# Patient Record
Sex: FEMALE | Race: BLACK OR AFRICAN AMERICAN | Employment: FULL TIME | URBAN - METROPOLITAN AREA
[De-identification: names, ages, dates, MRNs, and addresses within clinical notes are randomized per-mention and may not be internally consistent; named-entity substitution may affect disease eponyms.]

---

## 2017-05-02 ENCOUNTER — OFFICE VISIT (OUTPATIENT)
Dept: INTERNAL MEDICINE CLINIC | Age: 47
End: 2017-05-02

## 2017-05-02 VITALS
WEIGHT: 242 LBS | DIASTOLIC BLOOD PRESSURE: 79 MMHG | HEIGHT: 66 IN | TEMPERATURE: 97.7 F | HEART RATE: 91 BPM | BODY MASS INDEX: 38.89 KG/M2 | SYSTOLIC BLOOD PRESSURE: 121 MMHG | RESPIRATION RATE: 22 BRPM | OXYGEN SATURATION: 97 %

## 2017-05-02 DIAGNOSIS — B37.2 YEAST DERMATITIS: Primary | ICD-10-CM

## 2017-05-02 RX ORDER — KETOCONAZOLE 20 MG/G
CREAM TOPICAL
Qty: 15 G | Refills: 0 | Status: SHIPPED | OUTPATIENT
Start: 2017-05-02 | End: 2017-07-07 | Stop reason: SDUPTHER

## 2017-05-02 RX ORDER — INSULIN GLARGINE 100 [IU]/ML
INJECTION, SOLUTION SUBCUTANEOUS
Refills: 2 | COMMUNITY
Start: 2017-04-17 | End: 2017-05-02 | Stop reason: SDUPTHER

## 2017-05-02 RX ORDER — PEN NEEDLE, DIABETIC 31 GX5/16"
NEEDLE, DISPOSABLE MISCELLANEOUS
Refills: 11 | COMMUNITY
Start: 2017-03-25

## 2017-05-02 RX ORDER — FLUCONAZOLE 50 MG/1
50 TABLET ORAL DAILY
Qty: 5 TAB | Refills: 0 | Status: SHIPPED | OUTPATIENT
Start: 2017-05-02 | End: 2017-05-02 | Stop reason: SDUPTHER

## 2017-05-02 RX ORDER — SITAGLIPTIN AND METFORMIN HYDROCHLORIDE 50; 1000 MG/1; MG/1
TABLET, FILM COATED, EXTENDED RELEASE ORAL
Refills: 2 | COMMUNITY
Start: 2017-03-27 | End: 2017-05-02 | Stop reason: SDUPTHER

## 2017-05-02 RX ORDER — FLUCONAZOLE 50 MG/1
50 TABLET ORAL DAILY
Qty: 5 TAB | Refills: 0 | Status: SHIPPED | OUTPATIENT
Start: 2017-05-02 | End: 2017-05-07

## 2017-05-02 RX ORDER — KETOCONAZOLE 20 MG/G
CREAM TOPICAL
Qty: 15 G | Refills: 0 | Status: SHIPPED | OUTPATIENT
Start: 2017-05-02 | End: 2017-05-02 | Stop reason: SDUPTHER

## 2017-05-02 NOTE — PROGRESS NOTES
HISTORY OF PRESENT ILLNESS  Matias Steinberg is a 55 y.o. female. This is a patient of Dr. Jaclyn Douglas who presents today with complaints of rash. The patient has experienced symptoms x 1 month. She describes redness, itching, and burning to left groin. She has been applying triple antibiotic ointment, without relief. Visit Vitals    /79    Pulse 91    Temp 97.7 °F (36.5 °C) (Oral)    Resp 22    Ht 5' 5.5\" (1.664 m)    Wt 242 lb (109.8 kg)    SpO2 97%    BMI 39.66 kg/m2     HPI    Review of Systems   Constitutional: Negative for chills and fever. HENT: Negative for congestion. Eyes: Negative for blurred vision. Respiratory: Negative for cough and shortness of breath. Cardiovascular: Negative for chest pain, palpitations and leg swelling. Gastrointestinal: Negative for abdominal pain. Genitourinary: Negative. Musculoskeletal: Negative. Skin: Positive for itching and rash. Neurological: Negative for dizziness and headaches. Endo/Heme/Allergies: Negative. Psychiatric/Behavioral: Negative. Physical Exam   Constitutional: She is oriented to person, place, and time. She appears well-developed and well-nourished. No distress. HENT:   Head: Normocephalic and atraumatic. Cardiovascular: Normal rate and regular rhythm. Pulmonary/Chest: Effort normal and breath sounds normal. No respiratory distress. She has no wheezes. She has no rales. Musculoskeletal: She exhibits no edema. Neurological: She is alert and oriented to person, place, and time. Skin: Skin is warm and dry. Rash noted. Reddened macular rash with excoriation to left groin   Psychiatric: She has a normal mood and affect. Her behavior is normal.   Nursing note and vitals reviewed.       ASSESSMENT and PLAN    ICD-10-CM ICD-9-CM    1. Yeast dermatitis B37.2 112.3 Will order  fluconazole (DIFLUCAN) 50 mg tablet, 1 tab po daily x 5 days      ketoconazole (NIZORAL) 2 % topical cream apply to affected area of groin daily x 2 weeks. Provided information on GERD and diabetic diet per patient request.  Lab results and schedule of future lab studies reviewed with patient  Reviewed diet, exercise and weight control  Reviewed medications and side effects in detail  Patient encouraged to call or return to office if symptoms do not improve or worsen. Reviewed plan of care with patient who acknowledges understanding and agrees. Discussed above plan of care with Dr. Eda Masterson.

## 2017-05-02 NOTE — MR AVS SNAPSHOT
Visit Information Date & Time Provider Department Dept. Phone Encounter #  
 5/2/2017  2:00 PM Rojelio Sandra, [address] [facility] Internal Medicine [phone] [id number] Your Appointments 11/14/2017 11:15 AM  
PHYSICAL PRE OP with Kiran Romero DO [facility] Internal Medicine ([facility]) Appt Note: annual physical  
 [address] [facility] [address]  
[phone]  
  
   
 [address] [facility] [address] Upcoming Health Maintenance Date Due  
 FOOT EXAM Q1 5/26/1980 Pneumococcal 19-64 Medium Risk (1 of 1 - PPSV23) 5/26/1989 DTaP/Tdap/Td series (1 - Tdap) 5/26/1991 PAP AKA CERVICAL CYTOLOGY 5/26/1991 MICROALBUMIN Q1 2/28/2015 LIPID PANEL Q1 2/28/2015 HEMOGLOBIN A1C Q6M 2/4/2016 EYE EXAM RETINAL OR DILATED Q1 2/22/2017 INFLUENZA AGE 9 TO ADULT 8/1/2017 Allergies as of 5/2/2017  Review Complete On: 5/2/2017 By: Rojelio Sandra NP Severity Noted Reaction Type Reactions Lamisil [Terbinafine]  12/15/2011    Rash Lisinopril  03/27/2015    Hives  
 hives and swollen lip. Current Immunizations  Never Reviewed Name Date Influenza Vaccine Intradermal PF 11/8/2016 Pneumococcal Conjugate (PCV-13) 11/8/2016 Not reviewed this visit You Were Diagnosed With   
  
 Codes Comments Yeast dermatitis    -  Primary ICD-10-CM: B37.2 ICD-9-CM: 112.3 Vitals BP Pulse Temp Resp Height(growth percentile) Weight(growth percentile) 121/79 91 97.7 °F (36.5 °C) (Oral) 22 5' 5.5\" (1.664 m) 242 lb (109.8 kg) LMP SpO2 BMI OB Status Smoking Status 04/04/2017 (Approximate) 97% 39.66 kg/m2 Having regular periods Never Smoker BMI and BSA Data Body Mass Index Body Surface Area  
 39.66 kg/m 2 2.25 m 2 Preferred Pharmacy Pharmacy Name Phone [id number] [address] [id number] [phone] Your Updated Medication List  
  
   
This list is accurate as of: 5/2/17  2:52 PM.  Always use your most recent med list.  
  
  
  
  
 APIDRA SOLOSTAR 100 unit/mL pen Generic drug:  insulin glulisine INJECT 11 UNITS PLUS 2 UNITS FOR EVERY 25PTS > 100 WITH MEALS THREE TIMES A DAY SUBCUTANEOUS 30  
  
 aspirin delayed-release 81 mg tablet Take 81 mg by mouth daily. atenolol 50 mg tablet Commonly known as:  TENORMIN  
1QD - TAKE ONE TABLET BY MOUTH EVERY DAY  
  
 BENADRYL 25 mg capsule Generic drug:  diphenhydrAMINE Take 25 mg by mouth every six (6) hours as needed. butalbital-acetaminophen-caffeine -40 mg per tablet Commonly known as:  Waynetta Booze Take 1 Tab by mouth two (2) times daily as needed for Pain. Max Daily Amount: 2 Tabs. desogestrel-ethinyl estradiol 0.15-0.03 mg Tab Commonly known as:  DESOGEN Take 1 Tab by mouth daily. EPIPEN 2-KERI 0.3 mg/0.3 mL injection Generic drug:  EPINEPHrine INJECT 0.3 ML BY INTRAMUSCULAR ROUTE ONCE AS NEEDED FOR UP TO 1 DOSE.  
  
 fluconazole 50 mg tablet Commonly known as:  DIFLUCAN Take 1 Tab by mouth daily for 5 days. FDA advises cautious prescribing of oral fluconazole in pregnancy. HAIR,SKIN & NAILS PO Take  by mouth. Insulin Needles (Disposable) 31 gauge x 5/16\" Ndle Commonly known as:  BD INSULIN PEN NEEDLE UF SHORT  
UAD - USE AS DIRECTED  
  
 BD INSULIN PEN NEEDLE UF MINI 31 gauge x 3/16\" Ndle Generic drug:  Insulin Needles (Disposable) USE AS DIRECTED FOR INSULIN INJECTIONS,FOUR TIMES A DAY SUBCUTANEOUSLY FOR 30 DAYS 30  
  
 ketoconazole 2 % topical cream  
Commonly known as:  NIZORAL Apply to affected area daily LANTUS 100 unit/mL injection Generic drug:  insulin glargine 46 Units by SubCUTAneous route once. levothyroxine 125 mcg tablet Commonly known as:  SYNTHROID  
TAKE 1 TABLET EVERY DAY Liraglutide 0.6 mg/0.1 mL (18 mg/3 mL) sub-q pen Commonly known as:  VICTOZA  
by SubCUTAneous route. NexIUM 24HR 22.3 mg Cpdr  
Generic drug:  esomeprazole magnesium TAKE 1 CAPSULE EVERY DAY  
  
 ONETOUCH ULTRA TEST strip Generic drug:  glucose blood VI test strips  
by Does Not Apply route See Admin Instructions. SITagliptin-metFORMIN 50-1,000 mg per tablet Commonly known as:  JANUMET  
1BIDCM - TAKE ONE TABLET BY MOUTH TWICE DAILY WITH MEALS  
  
 SUMAtriptan 100 mg tablet Commonly known as:  IMITREX  
TAKE 1 TABLET BY MOUTH ONCE AS NEEDED FOR MIGRAINE  
  
 topiramate 50 mg tablet Commonly known as:  TOPAMAX TAKE 1 TABLET TWICE A DAY  
  
 triamcinolone acetonide 0.1 % topical cream  
Commonly known as:  KENALOG  
APPLY TO AFFECTED AREA TWO (2) TIMES A DAY. USE THIN LAYER  
  
  
  
  
Prescriptions Sent to Pharmacy Refills  
 fluconazole (DIFLUCAN) 50 mg tablet 0 Sig: Take 1 Tab by mouth daily for 5 days. FDA advises cautious prescribing of oral fluconazole in pregnancy. Class: Normal  
 Pharmacy: [id number] [address] [employer] Ph #: [phone] Route: Oral  
 ketoconazole (NIZORAL) 2 % topical cream 0 Sig: Apply to affected area daily Class: Normal  
 Pharmacy: [facility][id number] [place] [facility], [address] [employer] Ph #: [phone] To-Do List   
 05/30/2017 9:00 AM  
  Appointment with [facility] 4 at [address] [place] [address] ([phone]) Shower or bathe using soap and water. Do not use deodorant, powder, perfumes, or lotion the day of your exam.  If your prior mammograms were not performed at [facility] 6 please bring films with you or forward prior images 2 days before your procedure. Check in at registration 15min before your appointment time unless you were instructed to do otherwise.   A script is not necessary, but if you have one, please bring it on the day of the mammogram or have it faxed to the department. [facility] [phone] [address] [place] [address]  [phone] [facility] [address]  [phone] Critical access hospital [phone] [address] [facility] Percy Carlson [phone] Patient Instructions Gastroesophageal Reflux Disease (GERD): Care Instructions Your Care Instructions Gastroesophageal reflux disease (GERD) is the backward flow of stomach acid into the esophagus. The esophagus is the tube that leads from your throat to your stomach. A one-way valve prevents the stomach acid from moving up into this tube. When you have GERD, this valve does not close tightly enough. If you have mild GERD symptoms including heartburn, you may be able to control the problem with antacids or over-the-counter medicine. Changing your diet, losing weight, and making other lifestyle changes can also help reduce symptoms. Follow-up care is a key part of your treatment and safety. Be sure to make and go to all appointments, and call your doctor if you are having problems. Its also a good idea to know your test results and keep a list of the medicines you take. How can you care for yourself at home? · Take your medicines exactly as prescribed. Call your doctor if you think you are having a problem with your medicine. · Your doctor may recommend over-the-counter medicine. For mild or occasional indigestion, antacids, such as Tums, Gaviscon, Mylanta, or Maalox, may help. Your doctor also may recommend over-the-counter acid reducers, such as Pepcid AC, Tagamet HB, Zantac 75, or Prilosec. Read and follow all instructions on the label. If you use these medicines often, talk with your doctor. · Change your eating habits. ¨ Its best to eat several small meals instead of two or three large meals. ¨ After you eat, wait 2 to 3 hours before you lie down. ¨ Chocolate, mint, and alcohol can make GERD worse.  
¨ Spicy foods, foods that have a lot of acid (like tomatoes and oranges), and coffee can make GERD symptoms worse in some people. If your symptoms are worse after you eat a certain food, you may want to stop eating that food to see if your symptoms get better. · Do not smoke or chew tobacco. Smoking can make GERD worse. If you need help quitting, talk to your doctor about stop-smoking programs and medicines. These can increase your chances of quitting for good. · If you have GERD symptoms at night, raise the head of your bed 6 to 8 inches by putting the frame on blocks or placing a foam wedge under the head of your mattress. (Adding extra pillows does not work.) · Do not wear tight clothing around your middle. · Lose weight if you need to. Losing just 5 to 10 pounds can help. When should you call for help? Call your doctor now or seek immediate medical care if: 
· You have new or different belly pain. · Your stools are black and tarlike or have streaks of blood. Watch closely for changes in your health, and be sure to contact your doctor if: 
· Your symptoms have not improved after 2 days. · Food seems to catch in your throat or chest. 
Where can you learn more? Go to http://gallito-candace.info/. Enter E981 in the search box to learn more about \"Gastroesophageal Reflux Disease (GERD): Care Instructions. \" Current as of: August 9, 2016 Content Version: 11.2 © 2266-4925 [email or web address]. Care instructions adapted under license by [employer] (which disclaims liability or warranty for this information). If you have questions about a medical condition or this instruction, always ask your healthcare professional. [place] [address] any warranty or liability for your use of this information. Yeast Skin Infection: Care Instructions Your Care Instructions Yeast normally lives on your skin. Sometimes too much yeast can overgrow in certain areas of the skin and cause an infection.  The infection causes red, scaly, moist patches on your skin that may itch. Common areas for skin yeast infections are skin folds under the breasts or belly area. The warm and moist areas in the skin folds can make it easier for yeast to overgrow. Yeast infections also can be found on other parts of the body such as the groin or armpits. You will probably get a cream or ointment that contains an antifungal medicine. Examples of these medicines are miconazole and clotrimazole. You put it on your skin to treat the infection. Your doctor may give you a prescription for the cream or ointment. Or you may be able to buy it without a prescription at most drugstores. If the infection is severe, the doctor will prescribe antifungal pills. A yeast infection usually goes away after about a week of treatment. But it's important to use the medicine for as long as your doctor tells you to. Follow-up care is a key part of your treatment and safety. Be sure to make and go to all appointments, and call your doctor if you are having problems. It's also a good idea to know your test results and keep a list of the medicines you take. How can you care for yourself at home? · Be safe with medicines. Take your medicines exactly as prescribed. Call your doctor if you think you are having a problem with your medicine. · Keep your skin clean and dry. Your doctor may suggest using powder that contains an antifungal medicine in the skin folds. · Wear loose clothing. When should you call for help? Call your doctor now or seek immediate medical care if: 
· You have symptoms of infection, such as: 
¨ Increased pain, swelling, warmth, or redness. ¨ Red streaks leading from the area. ¨ Pus draining from the area. ¨ A fever. Watch closely for changes in your health, and be sure to contact your doctor if: 
· You find a new sore. · You do not get better as expected. Where can you learn more? Go to http://gallito-candace.info/. Enter X038 in the search box to learn more about \"Yeast Skin Infection: Care Instructions. \" Current as of: November 3, 2016 Content Version: 11.2 © 7967-3338 ZimpleMoney. Care instructions adapted under license by [facility] (which disclaims liability or warranty for this information). If you have questions about a medical condition or this instruction, always ask your healthcare professional. [address] any warranty or liability for your use of this information. Introducing [facility]! Dear Brian Guajardo: Thank you for requesting a [employer]or account. Our records indicate that you already have an active [employer]or account. You can access your account anytime at [email or web address] Did you know that you can access your hospital and ER discharge instructions at any time in Green Phosphor? You can also review all of your test results from your hospital stay or ER visit. Additional Information If you have questions, please visit the Frequently Asked Questions section of the [employer]or website at [email or web address] Remember, Green Phosphor is NOT to be used for urgent needs. For medical emergencies, dial 911. Now available from your iPhone and Android! Please provide this summary of care documentation to your next provider. Your primary care clinician is listed as Pilo Winston. If you have any questions after today's visit, please call [phone].

## 2017-05-02 NOTE — PROGRESS NOTES
Chief Complaint   Patient presents with    Rash     left groin area     Reviewed record  In preparation for visit and have obtained necessary documentation. 1. Have you been to the ER, urgent care clinic since your last visit? Hospitalized since your last visit? No    2. Have you seen or consulted any other health care providers outside of the [address] [place] [address] since your last visit? Include any pap smears or colon screening. No     Used 2 patient I. D. 's    Sees Endocrinologist.  Pap and mammogram scheduled for May 2017.

## 2017-05-02 NOTE — PATIENT INSTRUCTIONS
Gastroesophageal Reflux Disease (GERD): Care Instructions  Your Care Instructions    Gastroesophageal reflux disease (GERD) is the backward flow of stomach acid into the esophagus. The esophagus is the tube that leads from your throat to your stomach. A one-way valve prevents the stomach acid from moving up into this tube. When you have GERD, this valve does not close tightly enough. If you have mild GERD symptoms including heartburn, you may be able to control the problem with antacids or over-the-counter medicine. Changing your diet, losing weight, and making other lifestyle changes can also help reduce symptoms. Follow-up care is a key part of your treatment and safety. Be sure to make and go to all appointments, and call your doctor if you are having problems. Its also a good idea to know your test results and keep a list of the medicines you take. How can you care for yourself at home? · Take your medicines exactly as prescribed. Call your doctor if you think you are having a problem with your medicine. · Your doctor may recommend over-the-counter medicine. For mild or occasional indigestion, antacids, such as Tums, Gaviscon, Mylanta, or Maalox, may help. Your doctor also may recommend over-the-counter acid reducers, such as Pepcid AC, Tagamet HB, Zantac 75, or Prilosec. Read and follow all instructions on the label. If you use these medicines often, talk with your doctor. · Change your eating habits. ¨ Its best to eat several small meals instead of two or three large meals. ¨ After you eat, wait 2 to 3 hours before you lie down. ¨ Chocolate, mint, and alcohol can make GERD worse. ¨ Spicy foods, foods that have a lot of acid (like tomatoes and oranges), and coffee can make GERD symptoms worse in some people. If your symptoms are worse after you eat a certain food, you may want to stop eating that food to see if your symptoms get better.   · Do not smoke or chew tobacco. Smoking can make GERD worse. If you need help quitting, talk to your doctor about stop-smoking programs and medicines. These can increase your chances of quitting for good. · If you have GERD symptoms at night, raise the head of your bed 6 to 8 inches by putting the frame on blocks or placing a foam wedge under the head of your mattress. (Adding extra pillows does not work.)  · Do not wear tight clothing around your middle. · Lose weight if you need to. Losing just 5 to 10 pounds can help. When should you call for help? Call your doctor now or seek immediate medical care if:  · You have new or different belly pain. · Your stools are black and tarlike or have streaks of blood. Watch closely for changes in your health, and be sure to contact your doctor if:  · Your symptoms have not improved after 2 days. · Food seems to catch in your throat or chest.  Where can you learn more? Go to http://gallito-candace.info/. Enter J173 in the search box to learn more about \"Gastroesophageal Reflux Disease (GERD): Care Instructions. \"  Current as of: August 9, 2016  Content Version: 11.2  © 1959-3921 [employer]. Care instructions adapted under license by [employer] (which disclaims liability or warranty for this information). If you have questions about a medical condition or this instruction, always ask your healthcare professional. [address] any warranty or liability for your use of this information. Yeast Skin Infection: Care Instructions  Your Care Instructions    Yeast normally lives on your skin. Sometimes too much yeast can overgrow in certain areas of the skin and cause an infection. The infection causes red, scaly, moist patches on your skin that may itch. Common areas for skin yeast infections are skin folds under the breasts or belly area. The warm and moist areas in the skin folds can make it easier for yeast to overgrow.  Yeast infections also can be found on other parts of the body such as the groin or armpits. You will probably get a cream or ointment that contains an antifungal medicine. Examples of these medicines are miconazole and clotrimazole. You put it on your skin to treat the infection. Your doctor may give you a prescription for the cream or ointment. Or you may be able to buy it without a prescription at most drugstores. If the infection is severe, the doctor will prescribe antifungal pills. A yeast infection usually goes away after about a week of treatment. But it's important to use the medicine for as long as your doctor tells you to. Follow-up care is a key part of your treatment and safety. Be sure to make and go to all appointments, and call your doctor if you are having problems. It's also a good idea to know your test results and keep a list of the medicines you take. How can you care for yourself at home? · Be safe with medicines. Take your medicines exactly as prescribed. Call your doctor if you think you are having a problem with your medicine. · Keep your skin clean and dry. Your doctor may suggest using powder that contains an antifungal medicine in the skin folds. · Wear loose clothing. When should you call for help? Call your doctor now or seek immediate medical care if:  · You have symptoms of infection, such as:  ¨ Increased pain, swelling, warmth, or redness. ¨ Red streaks leading from the area. ¨ Pus draining from the area. ¨ A fever. Watch closely for changes in your health, and be sure to contact your doctor if:  · You find a new sore. · You do not get better as expected. Where can you learn more? Go to http://gallito-candace.info/. Enter D429 in the search box to learn more about \"Yeast Skin Infection: Care Instructions. \"  Current as of: November 3, 2016  Content Version: 11.2  © 3434-4187 [employer].  Care instructions adapted under license by [employer] (which disclaims liability or warranty for this information). If you have questions about a medical condition or this instruction, always ask your healthcare professional. [place] [address] any warranty or liability for your use of this information.

## 2017-05-09 ENCOUNTER — TELEPHONE (OUTPATIENT)
Dept: INTERNAL MEDICINE CLINIC | Age: 47
End: 2017-05-09

## 2017-05-09 RX ORDER — FLUCONAZOLE 50 MG/1
50 TABLET ORAL DAILY
Qty: 2 TAB | Refills: 0 | Status: SHIPPED | OUTPATIENT
Start: 2017-05-09 | End: 2017-07-07 | Stop reason: SDUPTHER

## 2017-05-09 NOTE — TELEPHONE ENCOUNTER
Patient in with her mother today for her mother's appt. She was seen 5/2/17 related to yeast dermatitis. She has completed 5 days of Diflucan. She has not yet started ketoconazole cream.  She has a history of allergy to Lamisil and has been concerned about starting this. She states rash seemed to improve but now has small cracked area. Pain has improved. Will order diflucan 50 daily x 2 more days. May try Ketoconazole. Notify of any reaction/ problem.

## 2017-06-13 ENCOUNTER — HOSPITAL ENCOUNTER (OUTPATIENT)
Dept: MAMMOGRAPHY | Age: 47
Discharge: HOME | End: 2017-06-13
Attending: INTERNAL MEDICINE
Payer: MEDICAID

## 2017-06-13 DIAGNOSIS — Z12.31 VISIT FOR SCREENING MAMMOGRAM: ICD-10-CM

## 2017-06-13 PROCEDURE — 77067 SCR MAMMO BI INCL CAD: CPT

## 2017-06-23 RX ORDER — TOPIRAMATE 50 MG/1
TABLET, FILM COATED ORAL
Qty: 60 TAB | Refills: 5 | Status: SHIPPED | OUTPATIENT
Start: 2017-06-23 | End: 2017-12-09 | Stop reason: SDUPTHER

## 2017-07-07 DIAGNOSIS — B37.2 YEAST DERMATITIS: ICD-10-CM

## 2017-07-07 RX ORDER — FLUCONAZOLE 50 MG/1
50 TABLET ORAL DAILY
Qty: 2 TAB | Refills: 0 | Status: SHIPPED | OUTPATIENT
Start: 2017-07-07 | End: 2017-07-09

## 2017-07-07 RX ORDER — KETOCONAZOLE 20 MG/G
CREAM TOPICAL
Qty: 15 G | Refills: 0 | Status: SHIPPED | OUTPATIENT
Start: 2017-07-07 | End: 2018-11-27 | Stop reason: ALTCHOICE

## 2017-07-10 ENCOUNTER — TELEPHONE (OUTPATIENT)
Dept: INTERNAL MEDICINE CLINIC | Age: 47
End: 2017-07-10

## 2017-07-10 RX ORDER — FLUCONAZOLE 50 MG/1
50 TABLET ORAL DAILY
Qty: 3 TAB | Refills: 0 | Status: SHIPPED | OUTPATIENT
Start: 2017-07-10 | End: 2017-07-13

## 2017-07-10 NOTE — TELEPHONE ENCOUNTER
Patient called, she is upset she only got Diflucan (2 tablets) --Rx sent to pharmacy last week. Patient stated when she called last week she was trying to make an appt to be seen but [facility] She has open skin area left groin and insisting she needs total 5 tablets (like an earlier Rx). She is going out of town this week.

## 2017-07-10 NOTE — TELEPHONE ENCOUNTER
Diflucan 50 mg tablet 1 tab po daily x 3 more days sent to pharmacy. Patient encouraged to call or return to office if symptoms do not improve or worsen.

## 2017-08-18 RX ORDER — SUMATRIPTAN 100 MG/1
TABLET, FILM COATED ORAL
Qty: 9 TAB | Refills: 5 | Status: SHIPPED | OUTPATIENT
Start: 2017-08-18 | End: 2021-06-04 | Stop reason: SDUPTHER

## 2017-11-13 ENCOUNTER — OFFICE VISIT (OUTPATIENT)
Dept: INTERNAL MEDICINE CLINIC | Age: 47
End: 2017-11-13

## 2017-11-13 VITALS
HEIGHT: 65 IN | WEIGHT: 223 LBS | SYSTOLIC BLOOD PRESSURE: 126 MMHG | DIASTOLIC BLOOD PRESSURE: 68 MMHG | RESPIRATION RATE: 19 BRPM | BODY MASS INDEX: 37.15 KG/M2 | HEART RATE: 81 BPM | OXYGEN SATURATION: 99 %

## 2017-11-13 DIAGNOSIS — Z79.4 TYPE 2 DIABETES MELLITUS WITHOUT COMPLICATION, WITH LONG-TERM CURRENT USE OF INSULIN (HCC): ICD-10-CM

## 2017-11-13 DIAGNOSIS — F41.9 ANXIETY: ICD-10-CM

## 2017-11-13 DIAGNOSIS — E11.9 TYPE 2 DIABETES MELLITUS WITHOUT COMPLICATION, WITH LONG-TERM CURRENT USE OF INSULIN (HCC): ICD-10-CM

## 2017-11-13 DIAGNOSIS — E89.0 POSTABLATIVE HYPOTHYROIDISM: ICD-10-CM

## 2017-11-13 DIAGNOSIS — I10 ESSENTIAL HYPERTENSION: ICD-10-CM

## 2017-11-13 DIAGNOSIS — L50.1 CHRONIC IDIOPATHIC URTICARIA: Primary | ICD-10-CM

## 2017-11-13 DIAGNOSIS — Z23 ENCOUNTER FOR IMMUNIZATION: ICD-10-CM

## 2017-11-13 RX ORDER — FAMOTIDINE 40 MG/1
40 TABLET, FILM COATED ORAL DAILY
COMMUNITY

## 2017-11-13 RX ORDER — MINERAL OIL
ENEMA (ML) RECTAL
COMMUNITY

## 2017-11-13 RX ORDER — ALPRAZOLAM 0.25 MG/1
0.25 TABLET ORAL
Qty: 15 TAB | Refills: 0 | Status: SHIPPED | OUTPATIENT
Start: 2017-11-13 | End: 2018-11-27 | Stop reason: ALTCHOICE

## 2017-11-13 NOTE — PATIENT INSTRUCTIONS
Chronic Hives: Care Instructions  Your Care Instructions  Chronic hives are long-lasting raised, red, and itchy patches of skin called wheals or welts. This condition is also called chronic urticaria. Hives usually have red borders and pale centers. They range in size from ¼ inch to 3 inches or more across. They may seem to move from place to place on the skin. Several hives may join to form a large area of raised, red skin. When hives and swelling last more than 6 weeks even with treatment, they are called chronic. A single spot of hives may last less than 36 hours, but the problem may come and go for weeks or months. In most people, the problem often lasts less than 1 year and almost always goes away within 5 years. Hives may occur with swelling under the skin (called angioedema). But you may have swelling without hives. Swelling may hurt a bit, but it does not usually itch like hives. It can be dangerous if severe swelling affects your throat, but this is very rare. You cannot spread hives to other people. Follow-up care is a key part of your treatment and safety. Be sure to make and go to all appointments, and call your doctor if you are having problems. It's also a good idea to know your test results and keep a list of the medicines you take. How can you care for yourself at home? · Avoid whatever you think may have caused your hives, such as a certain food or medicine. But you may not know the cause. · Put a cool, wet towel on the area to relieve itching. · Your doctor may suggest an over-the-counter antihistamine, such as cetirizine (Zyrtec), diphenhydramine (Benadryl), or loratadine (Claritin), to help control the hives and swelling. Read and follow all instructions on the label. These medicines can make you feel sleepy. Do not drive while using them. · Your doctor may prescribe a shot of epinephrine to carry with you in case you have a severe reaction.  Learn how to give yourself the shot, and keep it with you at all times. Make sure it has not . · If your doctor prescribes another medicine, take it exactly as directed. When should you call for help? Give an epinephrine shot if:  ? · You think you are having a severe allergic reaction. ? After giving an epinephrine shot call 911, even if you feel better. ?Call 911 if:  ? · You have symptoms of a severe allergic reaction. These may include:  ¨ Sudden raised, red areas (hives) all over your body. ¨ Swelling of the throat, mouth, lips, or tongue. ¨ Trouble breathing. ¨ Passing out (losing consciousness). Or you may feel very lightheaded or suddenly feel weak, confused, or restless. ? · You have been given an epinephrine shot, even if you feel better. ?Call your doctor now or seek immediate medical care if:  ? · Your hives get worse. ? Watch closely for changes in your health, and be sure to contact your doctor if:  ? · You do not get better as expected. Where can you learn more? Go to http://gallito-candace.info/. Enter H771 in the search box to learn more about \"Chronic Hives: Care Instructions. \"  Current as of: 2016  Content Version: 11.4  © 0017-3817 [employer]. Care instructions adapted under license by [employer] (which disclaims liability or warranty for this information). If you have questions about a medical condition or this instruction, always ask your healthcare professional. [place] [address] any warranty or liability for your use of this information.

## 2017-11-13 NOTE — PROGRESS NOTES
Health Maintenance Due   Topic Date Due    FOOT EXAM Q1  05/26/1980    Pneumococcal 19-64 Medium Risk (1 of 1 - PPSV23) 05/26/1989    DTaP/Tdap/Td series (1 - Tdap) 05/26/1991    PAP AKA CERVICAL CYTOLOGY  05/26/1991    MICROALBUMIN Q1  02/28/2015    LIPID PANEL Q1  02/28/2015    HEMOGLOBIN A1C Q6M  02/04/2016    EYE EXAM RETINAL OR DILATED Q1  02/22/2017    Influenza Age 5 to Adult  08/01/2017       Chief Complaint   Patient presents with    Physical    Tingling     bilateral feet    Anxiety    Diabetes       1. Have you been to the ER, urgent care clinic since your last visit? Hospitalized since your last visit? No    2. Have you seen or consulted any other health care providers outside of the [address] [place] [address] since your last visit? Include any pap smears or colon screening. No    3) Do you have an Advance Directive on file? no    4) Are you interested in receiving information on Advance Directives? NO      Patient is accompanied by self I have received verbal consent from ST ELVIRA SORIANO to discuss any/all medical information while they are present in the room. ST ELVIRA SORIANO is a 52 y.o. female  who presents for routine immunizations. She denies any symptoms , reactions or allergies that would exclude them from being immunized today. Risks and adverse reactions were discussed and the VIS was given to them. All questions were addressed. She was observed for 10 min post injection. There were no reactions observed.     Hector Narvaez LPN

## 2017-11-13 NOTE — MR AVS SNAPSHOT
Visit Information Date & Time Provider Department Dept. Phone Encounter #  
 11/13/2017  2:00 PM Maki Perez, [address] [facility] [phone] [id number] Follow-up Instructions Return in about 1 month (around 12/13/2017). Upcoming Health Maintenance Date Due Pneumococcal 19-64 Medium Risk (1 of 1 - PPSV23) 5/26/1989 DTaP/Tdap/Td series (1 - Tdap) 5/26/1991 PAP AKA CERVICAL CYTOLOGY 5/26/1991 MICROALBUMIN Q1 2/28/2015 LIPID PANEL Q1 2/28/2015 HEMOGLOBIN A1C Q6M 2/4/2016 EYE EXAM RETINAL OR DILATED Q1 2/22/2017 Influenza Age 5 to Adult 8/1/2017 FOOT EXAM Q1 11/13/2018 Allergies as of 11/13/2017  Review Complete On: 11/13/2017 By: Maki Perez, DO Severity Noted Reaction Type Reactions Lamisil [Terbinafine]  12/15/2011    Rash Lisinopril  03/27/2015    Hives  
 hives and swollen lip. Current Immunizations  Never Reviewed Name Date Influenza Vaccine (Quad) PF  Incomplete Influenza Vaccine Intradermal PF 11/8/2016 Pneumococcal Conjugate (PCV-13) 11/8/2016 Not reviewed this visit You Were Diagnosed With   
  
 Codes Comments Chronic idiopathic urticaria    -  Primary ICD-10-CM: L50.1 ICD-9-CM: 708. 1 Type 2 diabetes mellitus without complication, with long-term current use of insulin (HCC)     ICD-10-CM: E11.9, Z79.4 ICD-9-CM: 250.00, V58.67 Essential hypertension     ICD-10-CM: I10 
ICD-9-CM: 401.9 Postablative hypothyroidism     ICD-10-CM: E89.0 ICD-9-CM: 244.1 Anxiety     ICD-10-CM: F41.9 ICD-9-CM: 300.00 Encounter for immunization     ICD-10-CM: Q53 ICD-9-CM: V03.89 Vitals BP Pulse Resp Height(growth percentile) Weight(growth percentile) SpO2  
 126/68 (BP 1 Location: Right arm, BP Patient Position: Sitting) 81 19 5' 5\" (1.651 m) 223 lb (101.2 kg) 99% BMI OB Status Smoking Status 37.11 kg/m2 Having regular periods Never Smoker Vitals History BMI and BSA Data Body Mass Index Body Surface Area  
 37.11 kg/m 2 2.15 m 2 Preferred Pharmacy Pharmacy Name Phone [id number] [place] [address] [phone] Your Updated Medication List  
  
   
This list is accurate as of: 11/13/17  2:26 PM.  Always use your most recent med list.  
  
  
  
  
 ALPRAZolam 0.25 mg tablet Commonly known as:  Vernell Funmi Take 1 Tab by mouth daily as needed for Anxiety. APIDRA SOLOSTAR 100 unit/mL pen Generic drug:  insulin glulisine INJECT 11 UNITS PLUS 2 UNITS FOR EVERY 25PTS > 100 WITH MEALS THREE TIMES A DAY SUBCUTANEOUS 30  
  
 aspirin delayed-release 81 mg tablet Take 81 mg by mouth daily. atenolol 50 mg tablet Commonly known as:  TENORMIN  
1QD - TAKE ONE TABLET BY MOUTH EVERY DAY  
  
 BENADRYL 25 mg capsule Generic drug:  diphenhydrAMINE Take 25 mg by mouth every six (6) hours as needed. butalbital-acetaminophen-caffeine -40 mg per tablet Commonly known as:  Carissa Candie Take 1 Tab by mouth two (2) times daily as needed for Pain. Max Daily Amount: 2 Tabs. desogestrel-ethinyl estradiol 0.15-0.03 mg Tab Commonly known as:  DESOGEN Take 1 Tab by mouth daily. EPIPEN 2-KERI 0.3 mg/0.3 mL injection Generic drug:  EPINEPHrine INJECT 0.3 ML BY INTRAMUSCULAR ROUTE ONCE AS NEEDED FOR UP TO 1 DOSE.  
  
 famotidine 40 mg tablet Commonly known as:  PEPCID Take 40 mg by mouth daily. fexofenadine 180 mg tablet Commonly known as:  Elton Gustabo Take  by mouth. HAIR,SKIN & NAILS PO Take  by mouth. Insulin Needles (Disposable) 31 gauge x 5/16\" Ndle Commonly known as:  BD INSULIN PEN NEEDLE UF SHORT  
UAD - USE AS DIRECTED  
  
 BD INSULIN PEN NEEDLE UF MINI 31 gauge x 3/16\" Ndle Generic drug:  Insulin Needles (Disposable) USE AS DIRECTED FOR INSULIN INJECTIONS,FOUR TIMES A DAY SUBCUTANEOUSLY FOR 30 DAYS 30  
  
 ketoconazole 2 % topical cream  
Commonly known as:  NIZORAL Apply to affected area daily LANTUS 100 unit/mL injection Generic drug:  insulin glargine 46 Units by SubCUTAneous route once. levothyroxine 125 mcg tablet Commonly known as:  SYNTHROID  
TAKE 1 TABLET EVERY DAY Liraglutide 0.6 mg/0.1 mL (18 mg/3 mL) Pnij Commonly known as:  VICTOZA  
by SubCUTAneous route. NexIUM 24HR 22.3 mg Cpdr  
Generic drug:  esomeprazole magnesium TAKE 1 CAPSULE EVERY DAY  
  
 ONETOUCH ULTRA TEST strip Generic drug:  glucose blood VI test strips  
by Does Not Apply route See Admin Instructions. SITagliptin-metFORMIN 50-1,000 mg per tablet Commonly known as:  JANUMET  
1BIDCM - TAKE ONE TABLET BY MOUTH TWICE DAILY WITH MEALS  
  
 SUMAtriptan 100 mg tablet Commonly known as:  IMITREX  
TAKE 1 TABLET EVERY DAY AS NEEDED MIGRAINE  
  
 topiramate 50 mg tablet Commonly known as:  TOPAMAX TAKE 1 TABLET TWICE A DAY  
  
 triamcinolone acetonide 0.1 % topical cream  
Commonly known as:  KENALOG  
APPLY TO AFFECTED AREA TWO (2) TIMES A DAY. USE THIN LAYER  
  
  
  
  
Prescriptions Printed Refills ALPRAZolam (XANAX) 0.25 mg tablet 0 Sig: Take 1 Tab by mouth daily as needed for Anxiety. Class: Print Route: Oral  
  
We Performed the Following INFLUENZA VIRUS VAC QUAD,SPLIT,PRESV FREE SYRINGE IM [id number] CPT(R)] Follow-up Instructions Return in about 1 month (around 12/13/2017). Patient Instructions Chronic Hives: Care Instructions Your Care Instructions Chronic hives are long-lasting raised, red, and itchy patches of skin called wheals or welts. This condition is also called chronic urticaria. Hives usually have red borders and pale centers. They range in size from ¼ inch to 3 inches or more across. They may seem to move from place to place on the skin. Several hives may join to form a large area of raised, red skin. When hives and swelling last more than 6 weeks even with treatment, they are called chronic. A single spot of hives may last less than 36 hours, but the problem may come and go for weeks or months. In most people, the problem often lasts less than 1 year and almost always goes away within 5 years. Hives may occur with swelling under the skin (called angioedema). But you may have swelling without hives. Swelling may hurt a bit, but it does not usually itch like hives. It can be dangerous if severe swelling affects your throat, but this is very rare. You cannot spread hives to other people. Follow-up care is a key part of your treatment and safety. Be sure to make and go to all appointments, and call your doctor if you are having problems. It's also a good idea to know your test results and keep a list of the medicines you take. How can you care for yourself at home? · Avoid whatever you think may have caused your hives, such as a certain food or medicine. But you may not know the cause. · Put a cool, wet towel on the area to relieve itching. · Your doctor may suggest an over-the-counter antihistamine, such as cetirizine (Zyrtec), diphenhydramine (Benadryl), or loratadine (Claritin), to help control the hives and swelling. Read and follow all instructions on the label. These medicines can make you feel sleepy. Do not drive while using them. · Your doctor may prescribe a shot of epinephrine to carry with you in case you have a severe reaction. Learn how to give yourself the shot, and keep it with you at all times. Make sure it has not . · If your doctor prescribes another medicine, take it exactly as directed. When should you call for help? Give an epinephrine shot if: 
? · You think you are having a severe allergic reaction. ? After giving an epinephrine shot call 911, even if you feel better. ?Call 911 if: 
? · You have symptoms of a severe allergic reaction. These may include: ¨ Sudden raised, red areas (hives) all over your body. ¨ Swelling of the throat, mouth, lips, or tongue. ¨ Trouble breathing. ¨ Passing out (losing consciousness). Or you may feel very lightheaded or suddenly feel weak, confused, or restless. ? · You have been given an epinephrine shot, even if you feel better. ?Call your doctor now or seek immediate medical care if: 
? · Your hives get worse. ? Watch closely for changes in your health, and be sure to contact your doctor if: 
? · You do not get better as expected. Where can you learn more? Go to http://gallito-candace.info/. Enter N857 in the search box to learn more about \"Chronic Hives: Care Instructions. \" Current as of: September 29, 2016 Content Version: 11.4 © 1525-3715 [employer]. Care instructions adapted under license by [employer] (which disclaims liability or warranty for this information). If you have questions about a medical condition or this instruction, always ask your healthcare professional. [address] any warranty or liability for your use of this information. Introducing [facility] & HEALTH SERVICES! Dear Richard Blackwood: Thank you for requesting a SureWaves account. Our records indicate that you already have an active SureWaves account. You can access your account anytime at [email or web address] Did you know that you can access your hospital and ER discharge instructions at any time in SureWaves? You can also review all of your test results from your hospital stay or ER visit. Additional Information If you have questions, please visit the Frequently Asked Questions section of the SureWaves website at [email or web address] Remember, SureWaves is NOT to be used for urgent needs. For medical emergencies, dial 911. Now available from your iPhone and Android! Please provide this summary of care documentation to your next provider. Your primary care clinician is listed as Shereen Decker. If you have any questions after today's visit, please call [phone].

## 2017-11-13 NOTE — LETTER
[id number] [facility] 
[address] [facility],  [address] [place], 
[address] 
[phone] Work/School Note Date: 11/13/2017 To Whom It May concern: 
 
Coretta Almendarez was seen and treated today at [facility] by Dr. Luisa Farr, Coretta Almendarez may return to work on 11/14/17. Sincerely, 
 
 
Luisa Farr

## 2017-11-13 NOTE — PROGRESS NOTES
HISTORY OF PRESENT ILLNESS  Mariama Silva is a 52 y.o. female. Pt. comes in for f/u. Has multiple medical problems. Reports having had 5 ER visits since September for allergic reactions. In early September had a reaction to shellfish that she ate and ended up in the ER 2 days in a row. Use EpiPen at least once. Since then she has had episodes of tightness in her throat with swelling of her lips and tongue. Also occasional hives. Saw an allergist and has some testing done. Currently on Allegra, Pepcid, Nexium, hydroxyzine as needed. Had been taken off lisinopril many years ago for similar issues. Reports being anxious and wonders how much of this could be related. Has had some stress recently. Her DM and HTN are stable. Followed by endocrinologist.  Last A1c reported to be around 7. Reports compliance with medications and diet. Med list and most recent labs/studies reviewed with pt. Trying to be active physically to control weight. Denies tobacco or alcohol use. Reports no other new c/o. Anxiety   Associated symptoms include headaches. Pertinent negatives include no chest pain, no abdominal pain and no shortness of breath. Diabetes   Associated symptoms include headaches. Pertinent negatives include no chest pain, no abdominal pain and no shortness of breath. Allergies   Associated symptoms include headaches. Pertinent negatives include no chest pain, no abdominal pain and no shortness of breath. Hypertension    Associated symptoms include anxiety and headaches. Pertinent negatives include no chest pain, no blurred vision, no dizziness and no shortness of breath. Review of Systems   Constitutional: Negative. HENT: Negative. Eyes: Negative for blurred vision. Respiratory: Negative for shortness of breath. Cardiovascular: Negative for chest pain and leg swelling. Gastrointestinal: Negative for abdominal pain. Genitourinary: Negative for dysuria.    Musculoskeletal: Negative. Negative for joint pain. Skin: Positive for rash. Neurological: Positive for headaches. Negative for dizziness and sensory change. Endo/Heme/Allergies: Negative for polydipsia. Psychiatric/Behavioral: The patient is nervous/anxious. All other systems reviewed and are negative. Physical Exam   Constitutional: She is oriented to person, place, and time. She appears well-developed and well-nourished. No distress. Obese  pleasant   HENT:   Head: Normocephalic and atraumatic. Mouth/Throat: Oropharynx is clear and moist.   Eyes: Conjunctivae are normal. No scleral icterus. Neck: Normal range of motion. Neck supple. No JVD present. No thyromegaly present. Cardiovascular: Normal rate, regular rhythm, normal heart sounds and intact distal pulses. No murmur heard. Pulmonary/Chest: Effort normal and breath sounds normal. No respiratory distress. She has no wheezes. She has no rales. Abdominal: Soft. Bowel sounds are normal. She exhibits no distension. obese   Musculoskeletal: She exhibits no edema or tenderness. Neurological: She is alert and oriented to person, place, and time. Coordination normal.   Skin: Skin is warm and dry. No rash noted. Psychiatric: She has a normal mood and affect. Her behavior is normal.   Seems anxious   Nursing note and vitals reviewed. ASSESSMENT and PLAN  Diagnoses and all orders for this visit:    1. Chronic idiopathic urticaria    2. Type 2 diabetes mellitus without complication, with long-term current use of insulin ([facility] [address]    3. Essential hypertension    4. Postablative hypothyroidism    5. Anxiety    6. Encounter for immunization  -     Influenza virus vaccine (QUADRIVALENT PRES FREE SYRINGE) IM ([id number])    Other orders  -     ALPRAZolam (XANAX) 0.25 mg tablet; Take 1 Tab by mouth daily as needed for Anxiety. Follow-up Disposition:  Return in about 1 month (around 12/13/2017).    lab results and schedule of future lab studies reviewed with patient  reviewed diet, exercise and weight control  reviewed medications and side effects in detail  low cholesterol diet, weight control and daily exercise discussed, home glucose monitoring emphasized, all medications, side effects and compliance discussed carefully, foot care discussed and Podiatry visits discussed, annual eye examinations at Ophthalmology discussed, glycohemoglobin and other lab monitoring discussed and long term diabetic complications discussed  F/u with other MD's as scheduled  Explained to patient that the cause of her allergic reactions may never be known. Advised her to continue medications and follow-up with allergist as scheduled.   We will try Xanax as needed to see how much of this could be related to anxiety or panic attacks

## 2017-12-09 RX ORDER — TOPIRAMATE 50 MG/1
TABLET, FILM COATED ORAL
Qty: 60 TAB | Refills: 5 | Status: SHIPPED | OUTPATIENT
Start: 2017-12-09 | End: 2018-05-30 | Stop reason: SDUPTHER

## 2018-02-07 ENCOUNTER — OFFICE VISIT (OUTPATIENT)
Dept: INTERNAL MEDICINE CLINIC | Age: 48
End: 2018-02-07

## 2018-02-07 VITALS
TEMPERATURE: 98.8 F | BODY MASS INDEX: 35.32 KG/M2 | HEIGHT: 65 IN | RESPIRATION RATE: 18 BRPM | WEIGHT: 212 LBS | SYSTOLIC BLOOD PRESSURE: 132 MMHG | HEART RATE: 98 BPM | DIASTOLIC BLOOD PRESSURE: 74 MMHG | OXYGEN SATURATION: 100 %

## 2018-02-07 DIAGNOSIS — E11.9 TYPE 2 DIABETES MELLITUS WITHOUT COMPLICATION, WITH LONG-TERM CURRENT USE OF INSULIN (HCC): ICD-10-CM

## 2018-02-07 DIAGNOSIS — Z79.4 TYPE 2 DIABETES MELLITUS WITHOUT COMPLICATION, WITH LONG-TERM CURRENT USE OF INSULIN (HCC): ICD-10-CM

## 2018-02-07 DIAGNOSIS — R42 DIZZINESS: ICD-10-CM

## 2018-02-07 DIAGNOSIS — I10 ESSENTIAL HYPERTENSION: ICD-10-CM

## 2018-02-07 DIAGNOSIS — G43.709 CHRONIC MIGRAINE WITHOUT AURA WITHOUT STATUS MIGRAINOSUS, NOT INTRACTABLE: ICD-10-CM

## 2018-02-07 DIAGNOSIS — R07.9 CHEST PAIN, UNSPECIFIED TYPE: Primary | ICD-10-CM

## 2018-02-07 RX ORDER — MECLIZINE HYDROCHLORIDE 25 MG/1
25 TABLET ORAL
Qty: 30 TAB | Refills: 0 | Status: SHIPPED | OUTPATIENT
Start: 2018-02-07 | End: 2018-02-17

## 2018-02-07 RX ORDER — OMEPRAZOLE 40 MG/1
CAPSULE, DELAYED RELEASE ORAL
Refills: 3 | COMMUNITY
Start: 2018-01-08 | End: 2018-11-27 | Stop reason: ALTCHOICE

## 2018-02-07 RX ORDER — PROMETHAZINE HYDROCHLORIDE 25 MG/ML
25 INJECTION, SOLUTION INTRAMUSCULAR; INTRAVENOUS ONCE
Qty: 1 ML | Refills: 0
Start: 2018-02-07 | End: 2018-02-07

## 2018-02-07 RX ORDER — KETOROLAC TROMETHAMINE 30 MG/ML
60 INJECTION, SOLUTION INTRAMUSCULAR; INTRAVENOUS ONCE
Qty: 2 ML | Refills: 0
Start: 2018-02-07 | End: 2018-02-07

## 2018-02-07 NOTE — PROGRESS NOTES
Subjective:     Chief Complaint   Patient presents with    Dizziness     x 3 days    Headache    Chest Pain     x 4 days, goes and comes,        History of Present Illness    Jason Bowles is a 52y.o. year old female who presents for follow-up evaluation. She has multiple medical problems. She reports dizziness and headache for three days. She has a hx of migraines. She takes Topamax daily and Imitrex PRN. She states she is currently on her menstrual cycle. She denies and weakness. No visual changes. She also reports a 4 day hx of intermittent chest pain. She has a hx of anxiety. NAD. She states she has been feeling more lethargic over the past couple of days. She denies any other new complaints at this time. Reviewed medications, recent lab work and imaging with patient. Pt reports compliance with medications. Current Outpatient Prescriptions on File Prior to Visit   Medication Sig Dispense Refill    topiramate (TOPAMAX) 50 mg tablet TAKE 1 TABLET TWICE A DAY 60 Tab 5    famotidine (PEPCID) 40 mg tablet Take 40 mg by mouth daily.  fexofenadine (ALLEGRA) 180 mg tablet Take  by mouth.  ALPRAZolam (XANAX) 0.25 mg tablet Take 1 Tab by mouth daily as needed for Anxiety.  15 Tab 0    SUMAtriptan (IMITREX) 100 mg tablet TAKE 1 TABLET EVERY DAY AS NEEDED MIGRAINE 9 Tab 5    ketoconazole (NIZORAL) 2 % topical cream Apply to affected area daily 15 g 0    NEXIUM 24HR 22.3 mg cpDR TAKE 1 CAPSULE EVERY DAY  3    BD INSULIN PEN NEEDLE UF MINI 31 gauge x 3/16\" ndle USE AS DIRECTED FOR INSULIN INJECTIONS,FOUR TIMES A DAY SUBCUTANEOUSLY FOR 30 DAYS 30  11    EPIPEN 2-KERI 0.3 mg/0.3 mL injection INJECT 0.3 ML BY INTRAMUSCULAR ROUTE ONCE AS NEEDED FOR UP TO 1 DOSE. 2 Syringe prn    APIDRA SOLOSTAR 100 unit/mL pen INJECT 11 UNITS PLUS 2 UNITS FOR EVERY 25PTS > 100 WITH MEALS THREE TIMES A DAY SUBCUTANEOUS 30  2    levothyroxine (SYNTHROID) 125 mcg tablet TAKE 1 TABLET EVERY DAY  2    triamcinolone acetonide (KENALOG) 0.1 % topical cream APPLY TO AFFECTED AREA TWO (2) TIMES A DAY. USE THIN LAYER 15 g 0    butalbital-acetaminophen-caffeine (FIORICET, ESGIC) -40 mg per tablet Take 1 Tab by mouth two (2) times daily as needed for Pain. Max Daily Amount: 2 Tabs. 30 Tab 1    diphenhydrAMINE (BENADRYL) 25 mg capsule Take 25 mg by mouth every six (6) hours as needed.  Liraglutide (VICTOZA) 0.6 mg/0.1 mL (18 mg/3 mL) sub-q pen by SubCUTAneous route.  glucose blood VI test strips (ONE TOUCH ULTRA TEST) strip by Does Not Apply route See Admin Instructions.  insulin glargine (LANTUS) 100 unit/mL injection 46 Units by SubCUTAneous route once.  atenolol (TENORMIN) 50 mg tablet 1QD - TAKE ONE TABLET BY MOUTH EVERY DAY 30 Tab 0    sitagliptin-metformin (JANUMET) 50-1,000 mg per tablet 1BIDCM - TAKE ONE TABLET BY MOUTH TWICE DAILY WITH MEALS 60 Tab 0    Insulin Needles, Disposable, (BD INSULIN PEN NEEDLE UF SHORT) 31 X 5/16 \" Ndle UAD - USE AS DIRECTED 100 Package 5    aspirin delayed-release 81 mg tablet Take 81 mg by mouth daily.  MULTIVITAMINS W-MINERALS (HAIR,SKIN & NAILS PO) Take  by mouth.  desogestrel-ethinyl estradiol (DESOGEN) 0.15-0.03 mg per tablet Take 1 Tab by mouth daily. No current facility-administered medications on file prior to visit. Allergies   Allergen Reactions    Lamisil [Terbinafine] Rash    Lisinopril Hives     hives and swollen lip.       Past Medical History:   Diagnosis Date    Benign pancreatic islet cell tumors     2001    Diabetes ([facility] [address]     Thyroid disease     hyperthyroid      Past Surgical History:   Procedure Laterality Date    ABDOMEN SURGERY PROC UNLISTED      HX BREAST REDUCTION Bilateral     HX SPLENECTOMY      2002      Social History   Substance Use Topics    Smoking status: Never Smoker    Smokeless tobacco: Never Used    Alcohol use No      Family History   Problem Relation Age of Onset    Diabetes Mother     Elevated Lipids Mother     Hypertension Mother    Bharat Arenas Arthritis-rheumatoid Mother     Diabetes Father     Stroke Father         Objective:     Vitals:    02/07/18 1301   BP: 132/74   Pulse: 98   Resp: 18   Temp: 98.8 °F (37.1 °C)   TempSrc: Oral   SpO2: 100%   Weight: 212 lb (96.2 kg)   Height: 5' 5\" (1.651 m)       Review of Systems   Constitutional: Positive for malaise/fatigue. HENT: Negative. Eyes: Negative. Respiratory: Negative. Cardiovascular: Positive for chest pain. Gastrointestinal: Negative. Genitourinary: Negative. Musculoskeletal: Negative. Skin: Negative. Neurological: Positive for dizziness. Psychiatric/Behavioral: Negative. Physical Exam   Constitutional: She is oriented to person, place, and time. She appears well-developed and well-nourished. No distress. Well-appearing AA female. NAD   HENT:   Head: Normocephalic and atraumatic. Eyes: Conjunctivae and EOM are normal. Pupils are equal, round, and reactive to light. Neck: Normal range of motion. Neck supple. Cardiovascular: Normal rate, regular rhythm and normal heart sounds. Pulmonary/Chest: Effort normal and breath sounds normal. No respiratory distress. She has no wheezes. Abdominal: Soft. Bowel sounds are normal. She exhibits no distension. There is no tenderness. Musculoskeletal: Normal range of motion. She exhibits no edema, tenderness or deformity. Neurological: She is alert and oriented to person, place, and time. Skin: Skin is warm and dry. No rash noted. She is not diaphoretic. No erythema. No pallor. Psychiatric: She has a normal mood and affect. Her behavior is normal.   Nursing note and vitals reviewed. Assessment/ Plan:   Diagnoses and all orders for this visit:    1.  Chest pain, unspecified type   Will order  -     AMB POC EKG ROUTINE W/ 12 LEADS, INTER & REP   NSR  -     CBC W/O DIFF  -     METABOLIC PANEL, COMPREHENSIVE  -     LIPID PANEL  -     VITAMIN D, 25 HYDROXY  -     AMB POC EKG ROUTINE W/ 12 LEADS, INTER & REP    2. Type 2 diabetes mellitus without complication, with long-term current use of insulin (HCC)   Will order  -     CBC W/O DIFF  -     METABOLIC PANEL, COMPREHENSIVE  -     LIPID PANEL  -     VITAMIN D, 25 HYDROXY  -     HEMOGLOBIN A1C WITH EAG    3. Essential hypertension  -     CBC W/O DIFF  -     METABOLIC PANEL, COMPREHENSIVE  -     LIPID PANEL  -     VITAMIN D, 25 HYDROXY    4. Chronic migraine without aura without status migrainosus, not intractable   Will order  -     VITAMIN D, 25 HYDROXY  -     ketorolac tromethamine (TORADOL) 60 mg/2 mL soln; 2 mL by IntraMUSCular route once for 1 dose. -     KETOROLAC TROMETHAMINE INJ (Qty 4)  -     THER/PROPH/DIAG INJECTION, SUBCUT/IM  -     PROMETHAZINE HCL INJECTION (Qty 1)  -     promethazine (PHENERGAN) 25 mg/mL injection; 1 mL by IntraMUSCular route once for 1 dose. 5. Dizziness   Will order  -     meclizine (ANTIVERT) 25 mg tablet; Take 1 Tab by mouth three (3) times daily as needed for up to 10 days.  -     IRON PROFILE    Patient's plan of care has been reviewed with them. Patient and/or family have verbally conveyed their understanding and agreement of the patient's signs, symptoms, diagnosis, treatment and prognosis and additionally agree to follow up as recommended or return to [facility] should their condition change prior to follow-up. Discharge instructions have also been provided to the patient with some educational information regarding their diagnosis as well a list of reasons why they would want to return to the office prior to their follow-up appointment should their condition change. Follow-up as scheduled.

## 2018-02-07 NOTE — PATIENT INSTRUCTIONS
Chest Pain: Care Instructions  Your Care Instructions    There are many things that can cause chest pain. Some are not serious and will get better on their own in a few days. But some kinds of chest pain need more testing and treatment. Your doctor may have recommended a follow-up visit in the next 8 to 12 hours. If you are not getting better, you may need more tests or treatment. Even though your doctor has released you, you still need to watch for any problems. The doctor carefully checked you, but sometimes problems can develop later. If you have new symptoms or if your symptoms do not get better, get medical care right away. If you have worse or different chest pain or pressure that lasts more than 5 minutes or you passed out (lost consciousness), call 911 or seek other emergency help right away. A medical visit is only one step in your treatment. Even if you feel better, you still need to do what your doctor recommends, such as going to all suggested follow-up appointments and taking medicines exactly as directed. This will help you recover and help prevent future problems. How can you care for yourself at home? · Rest until you feel better. · Take your medicine exactly as prescribed. Call your doctor if you think you are having a problem with your medicine. · Do not drive after taking a prescription pain medicine. When should you call for help? Call 911 if:  ? · You passed out (lost consciousness). ? · You have severe difficulty breathing. ? · You have symptoms of a heart attack. These may include:  ¨ Chest pain or pressure, or a strange feeling in your chest.  ¨ Sweating. ¨ Shortness of breath. ¨ Nausea or vomiting. ¨ Pain, pressure, or a strange feeling in your back, neck, jaw, or upper belly or in one or both shoulders or arms. ¨ Lightheadedness or sudden weakness. ¨ A fast or irregular heartbeat.   After you call 911, the  may tell you to chew 1 adult-strength or 2 to 4 low-dose aspirin. Wait for an ambulance. Do not try to drive yourself. ?Call your doctor today if:  ? · You have any trouble breathing. ? · Your chest pain gets worse. ? · You are dizzy or lightheaded, or you feel like you may faint. ? · You are not getting better as expected. ? · You are having new or different chest pain. Where can you learn more? Go to http://gallito-candace.info/. Enter A120 in the search box to learn more about \"Chest Pain: Care Instructions. \"  Current as of: March 20, 2017  Content Version: 11.4  © 3691-8783 [email or web address]. Care instructions adapted under license by [employer] (which disclaims liability or warranty for this information). If you have questions about a medical condition or this instruction, always ask your healthcare professional. [address] any warranty or liability for your use of this information. Dizziness: Care Instructions  Your Care Instructions  Dizziness is the feeling of unsteadiness or fuzziness in your head. It is different than having vertigo, which is a feeling that the room is spinning or that you are moving or falling. It is also different from lightheadedness, which is the feeling that you are about to faint. It can be hard to know what causes dizziness. Some people feel dizzy when they have migraine headaches. Sometimes bouts of flu can make you feel dizzy. Some medical conditions, such as heart problems or high blood pressure, can make you feel dizzy. Many medicines can cause dizziness, including medicines for high blood pressure, pain, or anxiety. If a medicine causes your symptoms, your doctor may recommend that you stop or change the medicine. If it is a problem with your heart, you may need medicine to help your heart work better.  If there is no clear reason for your symptoms, your doctor may suggest watching and waiting for a while to see if the dizziness goes away on its own.  Follow-up care is a key part of your treatment and safety. Be sure to make and go to all appointments, and call your doctor if you are having problems. It's also a good idea to know your test results and keep a list of the medicines you take. How can you care for yourself at home? · If your doctor recommends or prescribes medicine, take it exactly as directed. Call your doctor if you think you are having a problem with your medicine. · Do not drive while you feel dizzy. · Try to prevent falls. Steps you can take include:  ¨ Using nonskid mats, adding grab bars near the tub, and using night-lights. ¨ Clearing your home so that walkways are free of anything you might trip on. ¨ Letting family and friends know that you have been feeling dizzy. This will help them know how to help you. When should you call for help? Call 911 anytime you think you may need emergency care. For example, call if:  ? · You passed out (lost consciousness). ? · You have dizziness along with symptoms of a heart attack. These may include:  ¨ Chest pain or pressure, or a strange feeling in the chest.  ¨ Sweating. ¨ Shortness of breath. ¨ Nausea or vomiting. ¨ Pain, pressure, or a strange feeling in the back, neck, jaw, or upper belly or in one or both shoulders or arms. ¨ Lightheadedness or sudden weakness. ¨ A fast or irregular heartbeat. ? · You have symptoms of a stroke. These may include:  ¨ Sudden numbness, tingling, weakness, or loss of movement in your face, arm, or leg, especially on only one side of your body. ¨ Sudden vision changes. ¨ Sudden trouble speaking. ¨ Sudden confusion or trouble understanding simple statements. ¨ Sudden problems with walking or balance. ¨ A sudden, severe headache that is different from past headaches. ?Call your doctor now or seek immediate medical care if:  ? · You feel dizzy and have a fever, headache, or ringing in your ears.    ? · You have new or increased nausea and vomiting. ? · Your dizziness does not go away or comes back. ? Watch closely for changes in your health, and be sure to contact your doctor if:  ? · You do not get better as expected. Where can you learn more? Go to http://gallito-candace.info/. Enter X580 in the search box to learn more about \"Dizziness: Care Instructions. \"  Current as of: March 20, 2017  Content Version: 11.4  © 3918-8149 [employer]. Care instructions adapted under license by [employer] (which disclaims liability or warranty for this information). If you have questions about a medical condition or this instruction, always ask your healthcare professional. [address] any warranty or liability for your use of this information.

## 2018-02-07 NOTE — LETTER
NOTIFICATION RETURN TO WORK  
2/7/2018 2:09 PM 
 
Ms. Higginbotham 51 [address] [address] To Whom It May Concern: 
 
[facility] EL[facility]A [facility] She will return to work on 2/8/18. If there are questions or concerns please have the patient contact our office. Sincerely, Mitch Wilkerson NP

## 2018-02-07 NOTE — PROGRESS NOTES
Health Maintenance Due   Topic Date Due    Pneumococcal 19-64 Medium Risk (1 of 1 - PPSV23) 05/26/1989    DTaP/Tdap/Td series (1 - Tdap) 05/26/1991    PAP AKA CERVICAL CYTOLOGY  05/26/1991    MICROALBUMIN Q1  02/28/2015    LIPID PANEL Q1  02/28/2015    HEMOGLOBIN A1C Q6M  02/04/2016    EYE EXAM RETINAL OR DILATED Q1  02/22/2017       Chief Complaint   Patient presents with    Dizziness     x 3 days    Headache    Chest Pain     x 4 days, goes and comes,        1. Have you been to the ER, urgent care clinic since your last visit? Hospitalized since your last visit? No    2. Have you seen or consulted any other health care providers outside of the [address] [place] [address] since your last visit? Include any pap smears or colon screening. No    3) Do you have an Advance Directive on file? no    4) Are you interested in receiving information on Advance Directives? NO      Patient is accompanied by self I have received verbal consent from [facility] to discuss any/all medical information while they are present in the room. After obtaining consent, and per orders of Louie Collins NP, injection of Toradol, & Phenergan given by Gabriela Grove LPN. Patient instructed to remain in clinic for 20 minutes afterwards, and to report any adverse reaction to me immediately.

## 2018-02-07 NOTE — MR AVS SNAPSHOT
[facility]  [address] 
[phone] Patient: Michelle Friendship MRN: [id number] Visit Information Date & Time Provider Department Dept. Phone Encounter #  
 2/7/2018  1:00 PM Darrion Paris, [address] [facility] [phone] [id number] Upcoming Health Maintenance Date Due Pneumococcal 19-64 Medium Risk (1 of 1 - PPSV23) 5/26/1989 DTaP/Tdap/Td series (1 - Tdap) 5/26/1991 PAP AKA CERVICAL CYTOLOGY 5/26/1991 MICROALBUMIN Q1 2/28/2015 LIPID PANEL Q1 2/28/2015 HEMOGLOBIN A1C Q6M 2/4/2016 EYE EXAM RETINAL OR DILATED Q1 2/22/2017 FOOT EXAM Q1 11/13/2018 Allergies as of 2/7/2018  Review Complete On: 2/7/2018 By: Darrion Paris NP Severity Noted Reaction Type Reactions Lamisil [Terbinafine]  12/15/2011    Rash Lisinopril  03/27/2015    Hives  
 hives and swollen lip. Current Immunizations  Never Reviewed Name Date Influenza Vaccine 11/13/2017 Influenza Vaccine (Quad) PF 11/13/2017 Influenza Vaccine Intradermal PF 11/8/2016 Pneumococcal Conjugate (PCV-13) 11/8/2016 Not reviewed this visit You Were Diagnosed With   
  
 Codes Comments Chest pain, unspecified type    -  Primary ICD-10-CM: R07.9 ICD-9-CM: 786.50 Type 2 diabetes mellitus without complication, with long-term current use of insulin (HCC)     ICD-10-CM: E11.9, Z79.4 ICD-9-CM: 250.00, V58.67 Essential hypertension     ICD-10-CM: I10 
ICD-9-CM: 401.9 Chronic migraine without aura without status migrainosus, not intractable     ICD-10-CM: F93.372 ICD-9-CM: 346.70 Dizziness     ICD-10-CM: V65 ICD-9-CM: 780.4 Vitals BP Pulse Temp Resp Height(growth percentile) Weight(growth percentile) 132/74 (BP 1 Location: Left arm, BP Patient Position: Sitting) 98 98.8 °F (37.1 °C) (Oral) 18 5' 5\" (1.651 m) 212 lb (96.2 kg) LMP SpO2 BMI OB Status Smoking Status 12/24/2017 (Approximate) 100% 35.28 kg/m2 Having regular periods Never Smoker Vitals History BMI and BSA Data Body Mass Index Body Surface Area  
 35.28 kg/m 2 2.1 m 2 Preferred Pharmacy Pharmacy Name Phone [id number] [phone] Your Updated Medication List  
  
   
This list is accurate as of: 2/7/18  1:50 PM.  Always use your most recent med list.  
  
  
  
  
 ALPRAZolam 0.25 mg tablet Commonly known as:  Inell Jenkins Take 1 Tab by mouth daily as needed for Anxiety. APIDRA SOLOSTAR 100 unit/mL pen Generic drug:  insulin glulisine INJECT 11 UNITS PLUS 2 UNITS FOR EVERY 25PTS > 100 WITH MEALS THREE TIMES A DAY SUBCUTANEOUS 30  
  
 aspirin delayed-release 81 mg tablet Take 81 mg by mouth daily. atenolol 50 mg tablet Commonly known as:  TENORMIN  
1QD - TAKE ONE TABLET BY MOUTH EVERY DAY  
  
 BENADRYL 25 mg capsule Generic drug:  diphenhydrAMINE Take 25 mg by mouth every six (6) hours as needed. butalbital-acetaminophen-caffeine -40 mg per tablet Commonly known as:  Sangamon Marking Take 1 Tab by mouth two (2) times daily as needed for Pain. Max Daily Amount: 2 Tabs. desogestrel-ethinyl estradiol 0.15-0.03 mg Tab Commonly known as:  DESOGEN Take 1 Tab by mouth daily. EPIPEN 2-KERI 0.3 mg/0.3 mL injection Generic drug:  EPINEPHrine INJECT 0.3 ML BY INTRAMUSCULAR ROUTE ONCE AS NEEDED FOR UP TO 1 DOSE.  
  
 famotidine 40 mg tablet Commonly known as:  PEPCID Take 40 mg by mouth daily. fexofenadine 180 mg tablet Commonly known as:  Dean Corbin Take  by mouth. HAIR,SKIN & NAILS PO Take  by mouth. Insulin Needles (Disposable) 31 gauge x 5/16\" Ndle Commonly known as:  BD INSULIN PEN NEEDLE UF SHORT  
UAD - USE AS DIRECTED  
  
 BD INSULIN PEN NEEDLE UF MINI 31 gauge x 3/16\" Ndle Generic drug:  Insulin Needles (Disposable) USE AS DIRECTED FOR INSULIN INJECTIONS,FOUR TIMES A DAY SUBCUTANEOUSLY FOR 30 DAYS 30  
  
 ketoconazole 2 % topical cream  
Commonly known as:  NIZORAL Apply to affected area daily  
  
 ketorolac tromethamine 60 mg/2 mL Soln Commonly known as:  TORADOL  
2 mL by IntraMUSCular route once for 1 dose. LANTUS 100 unit/mL injection Generic drug:  insulin glargine 46 Units by SubCUTAneous route once. levothyroxine 125 mcg tablet Commonly known as:  SYNTHROID  
TAKE 1 TABLET EVERY DAY Liraglutide 0.6 mg/0.1 mL (18 mg/3 mL) Pnij Commonly known as:  VICTOZA  
by SubCUTAneous route. meclizine 25 mg tablet Commonly known as:  ANTIVERT Take 1 Tab by mouth three (3) times daily as needed for up to 10 days. NexIUM 24HR 22.3 mg Cpdr  
Generic drug:  esomeprazole magnesium TAKE 1 CAPSULE EVERY DAY  
  
 omeprazole 40 mg capsule Commonly known as:  PRILOSEC  
TAKE ONE CAPSULE BY MOUTH TWICE A DAY 30 MINS PRIOR TO BREAKFAST OR 30MINS PRIOR TO DINNER  
  
 ONETOUCH ULTRA TEST strip Generic drug:  glucose blood VI test strips  
by Does Not Apply route See Admin Instructions. promethazine 25 mg/mL injection Commonly known as:  PHENERGAN  
1 mL by IntraMUSCular route once for 1 dose. SITagliptin-metFORMIN 50-1,000 mg per tablet Commonly known as:  JANUMET  
1BIDCM - TAKE ONE TABLET BY MOUTH TWICE DAILY WITH MEALS  
  
 SUMAtriptan 100 mg tablet Commonly known as:  IMITREX  
TAKE 1 TABLET EVERY DAY AS NEEDED MIGRAINE  
  
 topiramate 50 mg tablet Commonly known as:  TOPAMAX TAKE 1 TABLET TWICE A DAY  
  
 triamcinolone acetonide 0.1 % topical cream  
Commonly known as:  KENALOG  
APPLY TO AFFECTED AREA TWO (2) TIMES A DAY. USE THIN LAYER  
  
  
  
  
Prescriptions Sent to Pharmacy Refills  
 meclizine (ANTIVERT) 25 mg tablet 0 Sig: Take 1 Tab by mouth three (3) times daily as needed for up to 10 days. Class: Normal  
 [id number] Stewart, [address] Ph #: [phone] Route: Oral  
  
We Performed the Following AMB POC EKG ROUTINE W/ 12 LEADS, INTER & REP [39313 CPT(R)] AMB POC EKG ROUTINE W/ 12 LEADS, INTER & REP [95760 CPT(R)] CBC W/O DIFF [10167 CPT(R)] HEMOGLOBIN A1C WITH EAG [25298 CPT(R)] KETOROLAC TROMETHAMINE INJ [ [facility]] LIPID PANEL [80539 CPT(R)] METABOLIC PANEL, COMPREHENSIVE [80692 CPT(R)] WA THER/PROPH/DIAG INJECTION, SUBCUT/IM [id number] CPT(R)] PROMETHAZINE HCL INJECTION [ [facility]] VITAMIN D, 25 HYDROXY [id number] CPT(R)] Introducing [facility] SERVICES! Dear Barak Mancera: Thank you for requesting a [employer] account. Our records indicate that you already have an active [employer] Elite account. You can access your account anytime at [email or web address] Did you know that you can access your hospital and ER discharge instructions at any time in [employer] Elite? You can also review all of your test results from your hospital stay or ER visit. Additional Information If you have questions, please visit the Frequently Asked Questions section of the [employer] Elite website at [email or web address] Remember, [employer] Elite is NOT to be used for urgent needs. For medical emergencies, dial 911. Now available from your iPhone and Android! Please provide this summary of care documentation to your next provider. Your primary care clinician is listed as Giana King. If you have any questions after today's visit, please call [phone].

## 2018-02-07 NOTE — LETTER
2/8/2018 1:10 PM 
 
[facility] [address] [address] Dear [address] ELVIRA FULLERTL: Please find your most recent results below. Resulted Orders CBC W/O DIFF Result Value Ref Range WBC 10.1 3.4 - 10.8 x10E3/uL  
 RBC 4.00 3.77 - 5.28 x10E6/uL HGB 10.8 (L) 11.1 - 15.9 g/dL HCT 33.5 (L) 34.0 - 46.6 % MCV 84 79 - 97 fL  
 MCH 27.0 26.6 - 33.0 pg  
 MCHC 32.2 31.5 - 35.7 g/dL  
 RDW 17.1 (H) 12.3 - 15.4 % PLATELET 242 (H) 442 - 379 x10E3/uL Narrative Performed at:  [address]  [id number] : Solange Castillo MD, Phone:  [phone] METABOLIC PANEL, COMPREHENSIVE Result Value Ref Range Glucose 221 (H) 65 - 99 mg/dL BUN 10 6 - 24 mg/dL Creatinine 0.80 0.57 - 1.00 mg/dL GFR est non-AA 88 >59 mL/min/1.73 GFR est  >59 mL/min/1.73  
 BUN/Creatinine ratio 13 9 - 23 Sodium 138 134 - 144 mmol/L Potassium 4.3 3.5 - 5.2 mmol/L Chloride 105 96 - 106 mmol/L  
 CO2 20 18 - 29 mmol/L Calcium 9.0 8.7 - 10.2 mg/dL Protein, total 7.0 6.0 - 8.5 g/dL Albumin 3.7 3.5 - 5.5 g/dL GLOBULIN, TOTAL 3.3 1.5 - 4.5 g/dL A-G Ratio 1.1 (L) 1.2 - 2.2 Bilirubin, total 0.3 0.0 - 1.2 mg/dL Alk. phosphatase 36 (L) 39 - 117 IU/L  
 AST (SGOT) 44 (H) 0 - 40 IU/L  
 ALT (SGPT) 28 0 - 32 IU/L Narrative Performed at:  [address]  [id number] : Solange Castillo MD, Phone:  [phone] LIPID PANEL Result Value Ref Range Cholesterol, total 154 100 - 199 mg/dL Triglyceride 84 0 - 149 mg/dL HDL Cholesterol 76 >39 mg/dL VLDL, calculated 17 5 - 40 mg/dL LDL, calculated 61 0 - 99 mg/dL Narrative Performed at:  [address]  [id number] : Solange Castillo MD, Phone:  [phone] VITAMIN D, 25 HYDROXY Result Value Ref Range VITAMIN D, 25-HYDROXY 14.2 (L) 30.0 - 100.0 ng/mL Comment:  
   Vitamin D deficiency has been defined by the On license of [facility] practice guideline as a 
level of serum 25-OH vitamin D less than 20 ng/mL (1,2). The Endocrine Society went on to further define vitamin D 
insufficiency as a level between 21 and 29 ng/mL (2). 1. IOM ([place] of Medicine). 2010. Dietary reference 
   intakes for calcium and D. 430 [facility]: The 
   [employer]. 2. Isaac MF, Vikas NC, Cheryl MALIK, et al. 
   Evaluation, treatment, and prevention of vitamin D 
   deficiency: an Endocrine Society clinical practice 
   guideline. JCEM. 2011 Jul; 96(7):1911-30. Narrative Performed at:  [address]  [id number] : Kraig Hines MD, Phone:  [phone] HEMOGLOBIN A1C WITH EAG Result Value Ref Range Hemoglobin A1c 8.3 (H) 4.8 - 5.6 % Comment:  
            Pre-diabetes: 5.7 - 6.4 Diabetes: >6.4 Glycemic control for adults with diabetes: <7.0 Estimated average glucose 192 mg/dL Narrative Performed at:  [address]  [id number] : Kraig Hines MD, Phone:  [phone] CVD REPORT Result Value Ref Range INTERPRETATION Note Comment:  
   Supplemental report is available. Narrative Performed at:  [facility] [place] [address]  [id number] : Luke Vinson PhD, Phone:  [phone] DIABETES PATIENT EDUCATION Result Value Ref Range PDF Image Not applicable Narrative Performed at:  [id number] [place] [address]  [id number] : Luke Vinson PhD, Phone:  [phone] RECOMMENDATIONS: 
  1.  Platelets are slightly elevated.  Will recheck in 4-6 weeks to correlate. 2.  Vitamin D low.  Could be reason for fatigue.  Vitamin D 50,000 units weekly x 12 weeks.  After completion of 12 weeks, recommend OTC Vitamin D3 1000 units daily. 3.  Hgb A1c is 8.3.  She is followed by endocrinology. 4. Other labs stable 5. Awaiting Iron panel. Please call me if you have any questions: [phone] Sincerely, Karla Messer, NP

## 2018-02-07 NOTE — LETTER
2/8/2018 1:14 PM 
 
Ms. GALLARDO \[facility]""TL Veronica [address] 10 [address] [address] Dear [address] ELVIRA FULLERTL: Please find your most recent results below. Resulted Orders CBC W/O DIFF Result Value Ref Range WBC 10.1 3.4 - 10.8 x10E3/uL  
 RBC 4.00 3.77 - 5.28 x10E6/uL HGB 10.8 (L) 11.1 - 15.9 g/dL HCT 33.5 (L) 34.0 - 46.6 % MCV 84 79 - 97 fL  
 MCH 27.0 26.6 - 33.0 pg  
 MCHC 32.2 31.5 - 35.7 g/dL  
 RDW 17.1 (H) 12.3 - 15.4 % PLATELET 515 (H) 855 - 379 x10E3/uL Narrative Performed at:  [address]  [id number] : Yesenia Andrea MD, Phone:  [phone] METABOLIC PANEL, COMPREHENSIVE Result Value Ref Range Glucose 221 (H) 65 - 99 mg/dL BUN 10 6 - 24 mg/dL Creatinine 0.80 0.57 - 1.00 mg/dL GFR est non-AA 88 >59 mL/min/1.73 GFR est  >59 mL/min/1.73  
 BUN/Creatinine ratio 13 9 - 23 Sodium 138 134 - 144 mmol/L Potassium 4.3 3.5 - 5.2 mmol/L Chloride 105 96 - 106 mmol/L  
 CO2 20 18 - 29 mmol/L Calcium 9.0 8.7 - 10.2 mg/dL Protein, total 7.0 6.0 - 8.5 g/dL Albumin 3.7 3.5 - 5.5 g/dL GLOBULIN, TOTAL 3.3 1.5 - 4.5 g/dL A-G Ratio 1.1 (L) 1.2 - 2.2 Bilirubin, total 0.3 0.0 - 1.2 mg/dL Alk. phosphatase 36 (L) 39 - 117 IU/L  
 AST (SGOT) 44 (H) 0 - 40 IU/L  
 ALT (SGPT) 28 0 - 32 IU/L Narrative Performed at:  [address]  [id number] : Yesenia Andrea MD, Phone:  [phone] LIPID PANEL Result Value Ref Range Cholesterol, total 154 100 - 199 mg/dL Triglyceride 84 0 - 149 mg/dL HDL Cholesterol 76 >39 mg/dL VLDL, calculated 17 5 - 40 mg/dL LDL, calculated 61 0 - 99 mg/dL Narrative Performed at:  [address]  [id number] : Yesenia Andrea MD, Phone:  [phone] VITAMIN D, 25 HYDROXY Result Value Ref Range VITAMIN D, 25-HYDROXY 14.2 (L) 30.0 - 100.0 ng/mL Comment:  
   Vitamin D deficiency has been defined by the [facility] practice guideline as a 
level of serum 25-OH vitamin D less than 20 ng/mL (1,2). The Endocrine Society went on to further define vitamin D 
insufficiency as a level between 21 and 29 ng/mL (2). 1. IOM ([place] of Medicine). 2010. Dietary reference 
   intakes for calcium and D. 430 [facility]: The 
   [employer]. 2. Isaac MF, Vikas NC, Cheryl MALIK, et al. 
   Evaluation, treatment, and prevention of vitamin D 
   deficiency: an Endocrine Society clinical practice 
   guideline. JCEM. 2011 Jul; 96(7):1911-30. Narrative Performed at:  [address]  [id number] : Machelle Claire MD, Phone:  [phone] HEMOGLOBIN A1C WITH EAG Result Value Ref Range Hemoglobin A1c 8.3 (H) 4.8 - 5.6 % Comment:  
            Pre-diabetes: 5.7 - 6.4 Diabetes: >6.4 Glycemic control for adults with diabetes: <7.0 Estimated average glucose 192 mg/dL Narrative Performed at:  [address]  [id number] : Machelle Claire MD, Phone:  [phone] CVD REPORT Result Value Ref Range INTERPRETATION Note Comment:  
   Supplemental report is available. Narrative Performed at:  [facility] [place] [address]  [id number] : Isidra Javier PhD, Phone:  [phone] DIABETES PATIENT EDUCATION Result Value Ref Range PDF Image Not applicable Narrative Performed at:  [address] [address] [place], [place]  [id number] : Isidra Javier PhD, Phone:  [phone] RECOMMENDATIONS: 
1.  Platelets are slightly elevated.  Will recheck in 4-6 weeks to correlate. 2.  Vitamin D low.  Could be reason for fatigue.  Vitamin D 50,000 units weekly x 12 weeks.  After      completion of 12 weeks, recommend OTC Vitamin D3 1000 units daily. 3.  Hgb A1c is 8.3.  She is followed by endocrinology. 4.  Other labs stable 5. Awaiting Iron panel. Please call me if you have any questions: [phone] Sincerely, Soco Levi NP

## 2018-02-07 NOTE — LETTER
2/8/2018 1:11 PM 
 
[facility] [address] [address] Dear [address] ELVIRA FULLERTL: Please find your most recent results below. Resulted Orders CBC W/O DIFF Result Value Ref Range WBC 10.1 3.4 - 10.8 x10E3/uL  
 RBC 4.00 3.77 - 5.28 x10E6/uL HGB 10.8 (L) 11.1 - 15.9 g/dL HCT 33.5 (L) 34.0 - 46.6 % MCV 84 79 - 97 fL  
 MCH 27.0 26.6 - 33.0 pg  
 MCHC 32.2 31.5 - 35.7 g/dL  
 RDW 17.1 (H) 12.3 - 15.4 % PLATELET 480 (H) 291 - 379 x10E3/uL Narrative Performed at:  [address]  [id number] : Dallin Chan MD, Phone:  [phone] METABOLIC PANEL, COMPREHENSIVE Result Value Ref Range Glucose 221 (H) 65 - 99 mg/dL BUN 10 6 - 24 mg/dL Creatinine 0.80 0.57 - 1.00 mg/dL GFR est non-AA 88 >59 mL/min/1.73 GFR est  >59 mL/min/1.73  
 BUN/Creatinine ratio 13 9 - 23 Sodium 138 134 - 144 mmol/L Potassium 4.3 3.5 - 5.2 mmol/L Chloride 105 96 - 106 mmol/L  
 CO2 20 18 - 29 mmol/L Calcium 9.0 8.7 - 10.2 mg/dL Protein, total 7.0 6.0 - 8.5 g/dL Albumin 3.7 3.5 - 5.5 g/dL GLOBULIN, TOTAL 3.3 1.5 - 4.5 g/dL A-G Ratio 1.1 (L) 1.2 - 2.2 Bilirubin, total 0.3 0.0 - 1.2 mg/dL Alk. phosphatase 36 (L) 39 - 117 IU/L  
 AST (SGOT) 44 (H) 0 - 40 IU/L  
 ALT (SGPT) 28 0 - 32 IU/L Narrative Performed at:  [address]  [id number] : Dallin Chan MD, Phone:  [phone] LIPID PANEL Result Value Ref Range Cholesterol, total 154 100 - 199 mg/dL Triglyceride 84 0 - 149 mg/dL HDL Cholesterol 76 >39 mg/dL VLDL, calculated 17 5 - 40 mg/dL LDL, calculated 61 0 - 99 mg/dL Narrative Performed at:  [address]  [id number] : Dallin Chan MD, Phone:  [phone] VITAMIN D, 25 HYDROXY Result Value Ref Range VITAMIN D, 25-HYDROXY 14.2 (L) 30.0 - 100.0 ng/mL Comment:  
   Vitamin D deficiency has been defined by the [facility] practice guideline as a 
level of serum 25-OH vitamin D less than 20 ng/mL (1,2). The Endocrine Society went on to further define vitamin D 
insufficiency as a level between 21 and 29 ng/mL (2). 1. IOM ([place] of Medicine). 2010. Dietary reference 
   intakes for calcium and D. 430 [facility]: The 
   [employer]. 2. Isaac MF, Vikas NC, Cheryl MALIK, et al. 
   Evaluation, treatment, and prevention of vitamin D 
   deficiency: an Endocrine Society clinical practice 
   guideline. JCEM. 2011 Jul; 96(7):1911-30. Narrative Performed at:  [address]  [id number] : Jordan Kim MD, Phone:  [phone] HEMOGLOBIN A1C WITH EAG Result Value Ref Range Hemoglobin A1c 8.3 (H) 4.8 - 5.6 % Comment:  
            Pre-diabetes: 5.7 - 6.4 Diabetes: >6.4 Glycemic control for adults with diabetes: <7.0 Estimated average glucose 192 mg/dL Narrative Performed at:  [address]  [id number] : Jordan Kim MD, Phone:  [phone] CVD REPORT Result Value Ref Range INTERPRETATION Note Comment:  
   Supplemental report is available. Narrative Performed at:  [facility] [place] [address]  [id number] : You Tenorio PhD, Phone:  [phone] DIABETES PATIENT EDUCATION Result Value Ref Range PDF Image Not applicable Narrative Performed at:  [id number] [place] [address]  [id number] : You Tenorio PhD, Phone:  [phone] RECOMMENDATIONS: 
 
1.  Platelets are slightly elevated.  Will recheck in 4-6 weeks to correlate. 2.  Vitamin D low.  Could be reason for fatigue.  
     Vitamin D 50,000 units weekly x 12 weeks.  After completion of 12 weeks, recommend OTC             Vitamin D3 1000 units daily. 3.  Hgb A1c is 8.3. You are  followed by endocrinology. 4.  Other labs stable 5. Awaiting Iron panel. Please call me if you have any questions: [phone] Sincerely, Ancelmo Mayers NP

## 2018-02-08 LAB
25(OH)D3+25(OH)D2 SERPL-MCNC: 14.2 NG/ML (ref 30–100)
ALBUMIN SERPL-MCNC: 3.7 G/DL (ref 3.5–5.5)
ALBUMIN/GLOB SERPL: 1.1 {RATIO} (ref 1.2–2.2)
ALP SERPL-CCNC: 36 IU/L (ref 39–117)
ALT SERPL-CCNC: 28 IU/L (ref 0–32)
AST SERPL-CCNC: 44 IU/L (ref 0–40)
BILIRUB SERPL-MCNC: 0.3 MG/DL (ref 0–1.2)
BUN SERPL-MCNC: 10 MG/DL (ref 6–24)
BUN/CREAT SERPL: 13 (ref 9–23)
CALCIUM SERPL-MCNC: 9 MG/DL (ref 8.7–10.2)
CHLORIDE SERPL-SCNC: 105 MMOL/L (ref 96–106)
CHOLEST SERPL-MCNC: 154 MG/DL (ref 100–199)
CO2 SERPL-SCNC: 20 MMOL/L (ref 18–29)
CREAT SERPL-MCNC: 0.8 MG/DL (ref 0.57–1)
ERYTHROCYTE [DISTWIDTH] IN BLOOD BY AUTOMATED COUNT: 17.1 % (ref 12.3–15.4)
EST. AVERAGE GLUCOSE BLD GHB EST-MCNC: 192 MG/DL
GFR SERPLBLD CREATININE-BSD FMLA CKD-EPI: 102 ML/MIN/1.73
GFR SERPLBLD CREATININE-BSD FMLA CKD-EPI: 88 ML/MIN/1.73
GLOBULIN SER CALC-MCNC: 3.3 G/DL (ref 1.5–4.5)
GLUCOSE SERPL-MCNC: 221 MG/DL (ref 65–99)
HBA1C MFR BLD: 8.3 % (ref 4.8–5.6)
HCT VFR BLD AUTO: 33.5 % (ref 34–46.6)
HDLC SERPL-MCNC: 76 MG/DL
HGB BLD-MCNC: 10.8 G/DL (ref 11.1–15.9)
INTERPRETATION, 910389: NORMAL
LDLC SERPL CALC-MCNC: 61 MG/DL (ref 0–99)
Lab: NORMAL
MCH RBC QN AUTO: 27 PG (ref 26.6–33)
MCHC RBC AUTO-ENTMCNC: 32.2 G/DL (ref 31.5–35.7)
MCV RBC AUTO: 84 FL (ref 79–97)
PLATELET # BLD AUTO: 508 X10E3/UL (ref 150–379)
POTASSIUM SERPL-SCNC: 4.3 MMOL/L (ref 3.5–5.2)
PROT SERPL-MCNC: 7 G/DL (ref 6–8.5)
RBC # BLD AUTO: 4 X10E6/UL (ref 3.77–5.28)
SODIUM SERPL-SCNC: 138 MMOL/L (ref 134–144)
TRIGL SERPL-MCNC: 84 MG/DL (ref 0–149)
VLDLC SERPL CALC-MCNC: 17 MG/DL (ref 5–40)
WBC # BLD AUTO: 10.1 X10E3/UL (ref 3.4–10.8)

## 2018-02-08 RX ORDER — ERGOCALCIFEROL 1.25 MG/1
50000 CAPSULE ORAL
Qty: 12 CAP | Refills: 0 | Status: SHIPPED | OUTPATIENT
Start: 2018-02-08 | End: 2019-06-18

## 2018-02-08 NOTE — PROGRESS NOTES
1.  Platelets are slightly elevated. Will recheck in 4-6 weeks to correlate. 2.  Vitamin D low. Could be reason for fatigue. Vitamin D 50,000 units weekly x 12 weeks. After completion of 12 weeks, recommend OTC Vitamin D3 1000 units daily. 3.  Hgb A1c is 8.3. She is followed by endocrinology. Other labs stable  Awaiting Iron panel.

## 2018-04-20 ENCOUNTER — HOSPITAL ENCOUNTER (OUTPATIENT)
Dept: MAMMOGRAPHY | Age: 48
Discharge: HOME | End: 2018-04-20
Attending: INTERNAL MEDICINE
Payer: COMMERCIAL

## 2018-04-20 DIAGNOSIS — Z12.31 VISIT FOR SCREENING MAMMOGRAM: ICD-10-CM

## 2018-04-20 PROCEDURE — 77067 SCR MAMMO BI INCL CAD: CPT

## 2018-05-31 RX ORDER — TOPIRAMATE 50 MG/1
TABLET, FILM COATED ORAL
Qty: 60 TAB | Refills: 5 | Status: SHIPPED | OUTPATIENT
Start: 2018-05-31 | End: 2018-12-29 | Stop reason: SDUPTHER

## 2018-06-10 ENCOUNTER — HOSPITAL ENCOUNTER (EMERGENCY)
Age: 48
Discharge: HOME | End: 2018-06-10
Attending: EMERGENCY MEDICINE
Payer: COMMERCIAL

## 2018-06-10 ENCOUNTER — APPOINTMENT (OUTPATIENT)
Dept: CT IMAGING | Age: 48
End: 2018-06-10
Attending: NURSE PRACTITIONER
Payer: COMMERCIAL

## 2018-06-10 VITALS
OXYGEN SATURATION: 100 % | TEMPERATURE: 98.1 F | DIASTOLIC BLOOD PRESSURE: 90 MMHG | WEIGHT: 221.34 LBS | BODY MASS INDEX: 35.57 KG/M2 | SYSTOLIC BLOOD PRESSURE: 138 MMHG | RESPIRATION RATE: 18 BRPM | HEART RATE: 91 BPM | HEIGHT: 66 IN

## 2018-06-10 DIAGNOSIS — F41.9 ANXIETY: ICD-10-CM

## 2018-06-10 DIAGNOSIS — G44.209 ACUTE NON INTRACTABLE TENSION-TYPE HEADACHE: Primary | ICD-10-CM

## 2018-06-10 PROCEDURE — 99283 EMERGENCY DEPT VISIT LOW MDM: CPT

## 2018-06-10 PROCEDURE — 70450 CT HEAD/BRAIN W/O DYE: CPT

## 2018-06-10 PROCEDURE — 74011250637 HC RX REV CODE- 250/637: Performed by: NURSE PRACTITIONER

## 2018-06-10 RX ORDER — BUTALBITAL, ACETAMINOPHEN AND CAFFEINE 50; 325; 40 MG/1; MG/1; MG/1
2 TABLET ORAL
Status: COMPLETED | OUTPATIENT
Start: 2018-06-10 | End: 2018-06-10

## 2018-06-10 RX ORDER — IBUPROFEN 400 MG/1
800 TABLET ORAL
Status: COMPLETED | OUTPATIENT
Start: 2018-06-10 | End: 2018-06-10

## 2018-06-10 RX ORDER — METHOCARBAMOL 750 MG/1
750 TABLET, FILM COATED ORAL
Qty: 8 TAB | Refills: 0 | Status: SHIPPED | OUTPATIENT
Start: 2018-06-10 | End: 2018-11-27 | Stop reason: ALTCHOICE

## 2018-06-10 RX ORDER — METHOCARBAMOL 500 MG/1
1000 TABLET, FILM COATED ORAL
Status: COMPLETED | OUTPATIENT
Start: 2018-06-10 | End: 2018-06-10

## 2018-06-10 RX ADMIN — BUTALBITAL, ACETAMINOPHEN AND CAFFEINE 2 TABLET: 50; 325; 40 TABLET ORAL at 01:01

## 2018-06-10 RX ADMIN — METHOCARBAMOL 1000 MG: 500 TABLET ORAL at 01:01

## 2018-06-10 RX ADMIN — IBUPROFEN 800 MG: 400 TABLET ORAL at 01:01

## 2018-06-10 NOTE — DISCHARGE INSTRUCTIONS
Tension Headache: Care Instructions  Your Care Instructions  Most headaches are tension headaches. These headaches tend to happen again, especially if you are under stress. A tension headache may cause pain or a feeling of pressure all over your head. You probably can't pinpoint the center of the pain. If you keep getting tension headaches, the best thing you can do to limit them is to find out what is causing them and then make changes in those areas. Follow-up care is a key part of your treatment and safety. Be sure to make and go to all appointments, and call your doctor if you are having problems. It's also a good idea to know your test results and keep a list of the medicines you take. How can you care for yourself at home? · Rest in a quiet, dark room with a cool cloth on your forehead until your headache is gone. Close your eyes, and try to relax or go to sleep. Don't watch TV or read. Avoid using the computer. · Use a warm, moist towel or a heating pad set on low to relax tight shoulder and neck muscles. · Have someone gently massage your neck and shoulders. · Take pain medicines exactly as directed. ¨ If the doctor gave you a prescription medicine for pain, take it as prescribed. ¨ If you are not taking a prescription pain medicine, ask your doctor if you can take an over-the-counter medicine. · Be careful not to take pain medicine more often than the instructions allow, because you may get worse or more frequent headaches when the medicine wears off. · If you get another tension headache, stop what you are doing and sit quietly for a moment. Close your eyes and breathe slowly. Try to relax your head and neck muscles. · Do not ignore new symptoms that occur with a headache, such as fever, weakness or numbness, vision changes, or confusion. These may be signs of a more serious problem. To help prevent headaches  · Keep a headache diary so you can figure out what triggers your headaches. Avoiding triggers may help you prevent headaches. Record when each headache began, how long it lasted, and what the pain was like (throbbing, aching, stabbing, or dull). List anything that may have triggered the headache, such as being physically or emotionally stressed or being anxious or depressed. Other possible triggers are hunger, anger, fatigue, poor posture, and muscle strain. · Find healthy ways to deal with stress. Headaches are most common during or right after stressful times. Take time to relax before and after you do something that has caused a headache in the past.  · Exercise daily to relieve stress. Relaxation exercises may help reduce tension. · Get plenty of sleep. · Eat regularly and well. Long periods without food can trigger a headache. · Treat yourself to a massage. Some people find that massages are very helpful in relieving tension. · Try to keep your muscles relaxed by keeping good posture. Check your jaw, face, neck, and shoulder muscles for tension, and try to relax them. When sitting at a desk, change positions often, and stretch for 30 seconds each hour. · Reduce eyestrain from computers by blinking frequently and looking away from the computer screen every so often. Make sure you have proper eyewear and that your monitor is set up properly, about an arm's length away. When should you call for help? Call 911 anytime you think you may need emergency care. For example, call if:  ? · You have signs of a stroke. These may include:  ¨ Sudden numbness, paralysis, or weakness in your face, arm, or leg, especially on only one side of your body. ¨ Sudden vision changes. ¨ Sudden trouble speaking. ¨ Sudden confusion or trouble understanding simple statements. ¨ Sudden problems with walking or balance. ¨ A sudden, severe headache that is different from past headaches. ?Call your doctor now or seek immediate medical care if:  ? · You have new or worse nausea and vomiting.    ? · You have a new or higher fever. ? · Your headache gets much worse. ? Watch closely for changes in your health, and be sure to contact your doctor if:  ? · You are not getting better after 2 days (48 hours). Where can you learn more? Go to http://gallito-candace.info/. Enter 84 17 53 in the search box to learn more about \"Tension Headache: Care Instructions. \"  Current as of: October 14, 2016  Content Version: 11.4  © 5785-0824 [employer]. Care instructions adapted under license by [employer] (which disclaims liability or warranty for this information). If you have questions about a medical condition or this instruction, always ask your healthcare professional. [place] [address] any warranty or liability for your use of this information. Anxiety Disorder: Care Instructions  Your Care Instructions    Anxiety is a normal reaction to stress. Difficult situations can cause you to have symptoms such as sweaty palms and a nervous feeling. In an anxiety disorder, the symptoms are far more severe. Constant worry, muscle tension, trouble sleeping, nausea and diarrhea, and other symptoms can make normal daily activities difficult or impossible. These symptoms may occur for no reason, and they can affect your work, school, or social life. Medicines, counseling, and self-care can all help. Follow-up care is a key part of your treatment and safety. Be sure to make and go to all appointments, and call your doctor if you are having problems. It's also a good idea to know your test results and keep a list of the medicines you take. How can you care for yourself at home? · Take medicines exactly as directed. Call your doctor if you think you are having a problem with your medicine. · Go to your counseling sessions and follow-up appointments. · Recognize and accept your anxiety.  Then, when you are in a situation that makes you anxious, say to yourself, \"This is not an emergency. I feel uncomfortable, but I am not in danger. I can keep going even if I feel anxious. \"  · Be kind to your body:  ¨ Relieve tension with exercise or a massage. ¨ Get enough rest.  ¨ Avoid alcohol, caffeine, nicotine, and illegal drugs. They can increase your anxiety level and cause sleep problems. ¨ Learn and do relaxation techniques. See below for more about these techniques. · Engage your mind. Get out and do something you enjoy. Go to a funny movie, or take a walk or hike. Plan your day. Having too much or too little to do can make you anxious. · Keep a record of your symptoms. Discuss your fears with a good friend or family member, or join a support group for people with similar problems. Talking to others sometimes relieves stress. · Get involved in social groups, or volunteer to help others. Being alone sometimes makes things seem worse than they are. · Get at least 30 minutes of exercise on most days of the week to relieve stress. Walking is a good choice. You also may want to do other activities, such as running, swimming, cycling, or playing tennis or team sports. Relaxation techniques  Do relaxation exercises 10 to 20 minutes a day. You can play soothing, relaxing music while you do them, if you wish. · Tell others in your house that you are going to do your relaxation exercises. Ask them not to disturb you. · Find a comfortable place, away from all distractions and noise. · Lie down on your back, or sit with your back straight. · Focus on your breathing. Make it slow and steady. · Breathe in through your nose. Breathe out through either your nose or mouth. · Breathe deeply, filling up the area between your navel and your rib cage. Breathe so that your belly goes up and down. · Do not hold your breath. · Breathe like this for 5 to 10 minutes. Notice the feeling of calmness throughout your whole body.   As you continue to breathe slowly and deeply, relax by doing the following for another 5 to 10 minutes:  · Tighten and relax each muscle group in your body. You can begin at your toes and work your way up to your head. · Imagine your muscle groups relaxing and becoming heavy. · Empty your mind of all thoughts. · Let yourself relax more and more deeply. · Become aware of the state of calmness that surrounds you. · When your relaxation time is over, you can bring yourself back to alertness by moving your fingers and toes and then your hands and feet and then stretching and moving your entire body. Sometimes people fall asleep during relaxation, but they usually wake up shortly afterward. · Always give yourself time to return to full alertness before you drive a car or do anything that might cause an accident if you are not fully alert. Never play a relaxation tape while you drive a car. When should you call for help? Call 911 anytime you think you may need emergency care. For example, call if:  ? · You feel you cannot stop from hurting yourself or someone else. ? Keep the numbers for these national suicide hotlines: 6-332-912-TALK (8-284.631.9419) and 2-938-CHQWSJW (1-768.813.5592). If you or someone you know talks about suicide or feeling hopeless, get help right away. ? Watch closely for changes in your health, and be sure to contact your doctor if:  ? · You have anxiety or fear that affects your life. ? · You have symptoms of anxiety that are new or different from those you had before. Where can you learn more? Go to http://gallito-candace.info/. Enter P754 in the search box to learn more about \"Anxiety Disorder: Care Instructions. \"  Current as of: May 12, 2017  Content Version: 11.4  © 1691-3314 [employer]. Care instructions adapted under license by [employer] (which disclaims liability or warranty for this information).  If you have questions about a medical condition or this instruction, always ask your healthcare professional. [employer] disclaims any warranty or liability for your use of this information.

## 2018-06-10 NOTE — ED PROVIDER NOTES
HPI Comments: Marsha Parent is a 50 y.o. female with Hx of migraines,DMII, hyperthyroid, anxiety who presents ambulatory by herself to [address] [place] [address] ED with cc of headache. Pt reports the HA began this morning and has persisted throughout course of the day. She took 2 Excedrin migraine WNR. Pt has frequent migraines, takes Topamax and Imitrex. She states that Imitrex doesn't work so she doesn't take it. Also has c/o tension in upper back going up to neck. Pt is very stressed/ upset since last night because her mother was admitted to the hospital. Pt has been staying w/ her mother at the hospital until coming to the ED. Pt is worried something more severe is going on because this HA is not like her typical migraines. Pt has hx of anxiety and was supposed to take Xanax No nausea, vomiting, visual changes, fevers, chills, body aches, CP, abdominal pain, numbness/ tingling/ weakness in the extremities. (-) tobacco/ ETOH/ substance abuse. PCP: Nichelle Doherty DO    There are no other complaints, changes or physical findings at this time. The history is provided by the patient. Past Medical History:   Diagnosis Date    Benign pancreatic islet cell tumors     2001    Diabetes ([address]     Migraine     Thyroid disease     hyperthyroid       Past Surgical History:   Procedure Laterality Date    ABDOMEN SURGERY PROC UNLISTED      HX BREAST REDUCTION Bilateral 2004    HX SPLENECTOMY      2002         Family History:   Problem Relation Age of Onset    Diabetes Mother     Elevated Lipids Mother     Hypertension Mother    [facility] Arthritis-rheumatoid Mother     Diabetes Father     Stroke Father        Social History     Social History    Marital status: SINGLE     Spouse name: N/A    Number of children: N/A    Years of education: N/A     Occupational History    Not on file. Social History Main Topics    Smoking status: Never Smoker    Smokeless tobacco: Never Used    Alcohol use No    Drug use:  No  Sexual activity: No      Comment: single has one child     Other Topics Concern    Not on file     Social History Narrative         ALLERGIES: Bee sting [sting, bee]; Fish containing products; Lamisil [terbinafine]; Lisinopril; and Shellfish derived    Review of Systems   Constitutional: Negative for activity change, appetite change, chills and fever. HENT: Negative for congestion, rhinorrhea, sinus pressure, sneezing and sore throat. Eyes: Negative for pain, discharge and visual disturbance. Respiratory: Negative for cough and shortness of breath. Cardiovascular: Negative for chest pain. Gastrointestinal: Negative for abdominal pain, diarrhea, nausea and vomiting. Genitourinary: Negative for dysuria, flank pain, frequency and urgency. Musculoskeletal: Negative for arthralgias, back pain, gait problem, joint swelling, myalgias and neck pain. Skin: Negative for color change and rash. Neurological: Positive for headaches. Negative for dizziness, speech difficulty, weakness, light-headedness and numbness. Psychiatric/Behavioral: Negative for agitation, behavioral problems and confusion. The patient is nervous/anxious. All other systems reviewed and are negative. Vitals:    06/10/18 0023 06/10/18 0100 06/10/18 0200   BP: 139/86 134/85 138/90   Pulse: 98  91   Resp: 16  18   Temp: 98.4 °F (36.9 °C)  98.1 °F (36.7 °C)   SpO2: 100% 99% 100%   Weight: 100.4 kg (221 lb 5.5 oz)     Height: 5' 6\" (1.676 m)              Physical Exam   Constitutional: She is oriented to person, place, and time. She appears well-developed and well-nourished. No distress. HENT:   Head: Normocephalic and atraumatic. Right Ear: External ear normal.   Left Ear: External ear normal.   Nose: Nose normal.   Mouth/Throat: Oropharynx is clear and moist. No oropharyngeal exudate. Eyes: Conjunctivae and EOM are normal. Pupils are equal, round, and reactive to light.    Neck: Trachea normal, normal range of motion and full passive range of motion without pain. Neck supple. Muscular tenderness present. No spinous process tenderness present. Cardiovascular: Normal rate, regular rhythm, normal heart sounds and intact distal pulses. Pulmonary/Chest: Effort normal and breath sounds normal.   Abdominal: Soft. Musculoskeletal: Normal range of motion. Trapezius tension present    Neurological: She is alert and oriented to person, place, and time. No cranial nerve deficit. Skin: Skin is warm and dry. Psychiatric: She has a normal mood and affect. Judgment and thought content normal. Her speech is delayed. She is withdrawn. Tearful during exam    Nursing note and vitals reviewed. MDM  Number of Diagnoses or Management Options  Acute non intractable tension-type headache:   Anxiety:   Diagnosis management comments: DDx: migraine, tension HA, stress, anxiety     51 yo F presents w/ posterior HA, neck/ back tension that is different from her typical migraine. Worried there may be something more severe wrong. CT head (-). Given medication for muscle related/ tension HA in the ED w/ resolution of s/sx. Discussed things pt can do to help w/ s/sx relief. Referred to PCP and reasons to return to ED provided. Amount and/or Complexity of Data Reviewed  Tests in the radiology section of CPT®: ordered and reviewed  Review and summarize past medical records: yes          ED Course       Procedures    LABORATORY TESTS:  No results found for this or any previous visit (from the past 12 hour(s)). IMAGING RESULTS:  CT HEAD WO CONT   Final Result      EXAM:  CT HEAD WO CONT     INDICATION:   worsening HA, not similar to prior migraines     COMPARISON: None.     CONTRAST:  None.     TECHNIQUE: Unenhanced CT of the head was performed using 5 mm images. Brain and  bone windows were generated.   CT dose reduction was achieved through use of a  standardized protocol tailored for this examination and automatic exposure  control for dose modulation.       FINDINGS:  The ventricles and sulci are normal in size, shape and configuration and  midline. There is no significant white matter disease. There is no intracranial  hemorrhage, extra-axial collection, mass, mass effect or midline shift. The  basilar cisterns are open. No acute infarct is identified. The bone windows  demonstrate no abnormalities. The visualized portions of the paranasal sinuses  and mastoid air cells are clear.     IMPRESSION  IMPRESSION: No acute abnormality. MEDICATIONS GIVEN:  Medications   methocarbamol (ROBAXIN) tablet 1,000 mg (1,000 mg Oral Given 6/10/18 0101)   ibuprofen (MOTRIN) tablet 800 mg (800 mg Oral Given 6/10/18 0101)   butalbital-acetaminophen-caffeine (FIORICET, ESGIC) -40 mg per tablet 2 Tab (2 Tabs Oral Given 6/10/18 0101)       IMPRESSION:  1. Acute non intractable tension-type headache    2. Anxiety        PLAN:  1. Discharge Medication List as of 6/10/2018  1:48 AM      START taking these medications    Details   methocarbamol (ROBAXIN) 750 mg tablet Take 1 Tab by mouth four (4) times daily as needed. , Print, Disp-8 Tab, R-0         CONTINUE these medications which have NOT CHANGED    Details   topiramate (TOPAMAX) 50 mg tablet TAKE 1 TABLET TWICE A DAY, Normal, Disp-60 Tab, R-5      ergocalciferol (ERGOCALCIFEROL) 50,000 unit capsule Take 1 Cap by mouth every seven (7) days. , Normal, Disp-12 Cap, R-0      omeprazole (PRILOSEC) 40 mg capsule TAKE ONE CAPSULE BY MOUTH TWICE A DAY 30 MINS PRIOR TO BREAKFAST OR 30MINS PRIOR TO DINNER, Historical Med, R-3      famotidine (PEPCID) 40 mg tablet Take 40 mg by mouth daily. , Historical Med      fexofenadine (ALLEGRA) 180 mg tablet Take  by mouth., Historical Med      ALPRAZolam (XANAX) 0.25 mg tablet Take 1 Tab by mouth daily as needed for Anxiety. , Print, Disp-15 Tab, R-0      SUMAtriptan (IMITREX) 100 mg tablet TAKE 1 TABLET EVERY DAY AS NEEDED MIGRAINE, Normal, Disp-9 Tab, R-5      ketoconazole (NIZORAL) 2 % topical cream Apply to affected area daily, Normal, Disp-15 g, R-0      NEXIUM 24HR 22.3 mg cpDR TAKE 1 CAPSULE EVERY DAY, Historical Med, R-3, EUFEMIA      BD INSULIN PEN NEEDLE UF MINI 31 gauge x 3/16\" ndle USE AS DIRECTED FOR INSULIN INJECTIONS,FOUR TIMES A DAY SUBCUTANEOUSLY FOR 30 DAYS 30, Historical Med, R-11, EUFEMIA      EPIPEN 2-KERI 0.3 mg/0.3 mL injection INJECT 0.3 ML BY INTRAMUSCULAR ROUTE ONCE AS NEEDED FOR UP TO 1 DOSE., Normal, Disp-2 Syringe, R-prn      APIDRA SOLOSTAR 100 unit/mL pen INJECT 11 UNITS PLUS 2 UNITS FOR EVERY 25PTS > 100 WITH MEALS THREE TIMES A DAY SUBCUTANEOUS 30, Historical Med, R-2, EUFEMIA      levothyroxine (SYNTHROID) 125 mcg tablet TAKE 1 TABLET EVERY DAY, Historical Med, R-2      triamcinolone acetonide (KENALOG) 0.1 % topical cream APPLY TO AFFECTED AREA TWO (2) TIMES A DAY. USE THIN LAYER, Normal, Disp-15 g, R-0      butalbital-acetaminophen-caffeine (FIORICET, ESGIC) -40 mg per tablet Take 1 Tab by mouth two (2) times daily as needed for Pain. Max Daily Amount: 2 Tabs., Print, Disp-30 Tab, R-1      desogestrel-ethinyl estradiol (DESOGEN) 0.15-0.03 mg per tablet Take 1 Tab by mouth daily. , Historical Med      diphenhydrAMINE (BENADRYL) 25 mg capsule Take 25 mg by mouth every six (6) hours as needed., Historical Med      Liraglutide (VICTOZA) 0.6 mg/0.1 mL (18 mg/3 mL) sub-q pen by SubCUTAneous route., Historical Med      glucose blood VI test strips (ONE TOUCH ULTRA TEST) strip by Does Not Apply route See Admin Instructions.   , Historical Med      insulin glargine (LANTUS) 100 unit/mL injection 46 Units by SubCUTAneous route once., Historical Med      atenolol (TENORMIN) 50 mg tablet 1QD - TAKE ONE TABLET BY MOUTH EVERY DAY, NormalWill not refill again without office visitDisp-30 Tab, R-0      sitagliptin-metformin (JANUMET) 50-1,000 mg per tablet 1BIDCM - TAKE ONE TABLET BY MOUTH TWICE DAILY WITH MEALS, NormalWill not refill again without office visitDisp-60 Tab, R-0 Insulin Needles, Disposable, (BD INSULIN PEN NEEDLE UF SHORT) 31 X 5/16 \" Ndle UAD - USE AS DIRECTED, Normal, Disp-100 Package, R-5      aspirin delayed-release 81 mg tablet Take 81 mg by mouth daily. , Historical Med      MULTIVITAMINS W-MINERALS (HAIR,SKIN & NAILS PO) Take  by mouth., Historical Med           2. Follow-up Information     Follow up With Details Comments Contact Info    Amy Favorite, DO Schedule an appointment as soon as possible for a visit  [address] [place], [facility] DEP Go to As needed, If symptoms worsen [facility]  [phone]        3. Return to ED if worse   Discharge Note:    The patient is ready for discharge. The patient's signs, symptoms, diagnosis, and discharge instruction have been discussed and the patient has conveyed their understanding. The patient is to follow up as recommended or return to the ER should their symptoms worsen. Plan has been discussed and the patient is in agreement.     Arsenio Curtis NP

## 2018-06-10 NOTE — ED TRIAGE NOTES
Triage:  Pt comes in ambulatory from home complaining of a posterior headache. Pt reports a history of migraines but this \"feels different\".   Pt denies fever, chills, n/v/d

## 2018-06-19 ENCOUNTER — OFFICE VISIT (OUTPATIENT)
Dept: URGENT CARE | Age: 48
End: 2018-06-19

## 2018-06-19 VITALS
HEART RATE: 99 BPM | BODY MASS INDEX: 35.52 KG/M2 | OXYGEN SATURATION: 98 % | DIASTOLIC BLOOD PRESSURE: 80 MMHG | TEMPERATURE: 97.8 F | RESPIRATION RATE: 18 BRPM | SYSTOLIC BLOOD PRESSURE: 135 MMHG | HEIGHT: 66 IN | WEIGHT: 221 LBS

## 2018-06-19 DIAGNOSIS — T19.2XXA FOREIGN BODY IN VAGINA, INITIAL ENCOUNTER: Primary | ICD-10-CM

## 2018-06-19 DIAGNOSIS — N89.8 VAGINAL DISCHARGE: ICD-10-CM

## 2018-06-19 RX ORDER — FLUCONAZOLE 150 MG/1
150 TABLET ORAL DAILY
Qty: 1 TAB | Refills: 0 | Status: SHIPPED | OUTPATIENT
Start: 2018-06-19 | End: 2018-06-20

## 2018-06-19 RX ORDER — DICLOFENAC SODIUM 75 MG/1
TABLET, DELAYED RELEASE ORAL
Refills: 1 | COMMUNITY
Start: 2018-05-18 | End: 2022-09-08

## 2018-06-19 RX ORDER — AMLODIPINE BESYLATE 10 MG/1
TABLET ORAL
Refills: 3 | COMMUNITY
Start: 2018-05-12

## 2018-06-19 NOTE — PROGRESS NOTES
HPI Comments: James Davis presents with retained condom for the past 4 days. Reports mild vaginal discharge. Denies vaginal pain, abdominal pain. The history is provided by the patient. Past Medical History:   Diagnosis Date    Benign pancreatic islet cell tumors     2001    Diabetes ([address]     Migraine     Thyroid disease     hyperthyroid        Past Surgical History:   Procedure Laterality Date    ABDOMEN SURGERY PROC UNLISTED      HX BREAST REDUCTION Bilateral 2004    HX SPLENECTOMY      2002         Family History   Problem Relation Age of Onset    Diabetes Mother     Elevated Lipids Mother     Hypertension Mother    [facility] Arthritis-rheumatoid Mother     Diabetes Father     Stroke Father         Social History     Social History    Marital status: SINGLE     Spouse name: N/A    Number of children: N/A    Years of education: N/A     Occupational History    Not on file. Social History Main Topics    Smoking status: Never Smoker    Smokeless tobacco: Never Used    Alcohol use No    Drug use: No    Sexual activity: No      Comment: single has one child     Other Topics Concern    Not on file     Social History Narrative                ALLERGIES: Bee sting [sting, bee]; Fish containing products; Lamisil [terbinafine]; Lisinopril; and Shellfish derived    Review of Systems   Constitutional: Negative for chills and fever. Respiratory: Negative for shortness of breath and wheezing. Cardiovascular: Negative for chest pain and palpitations. Genitourinary: Positive for vaginal discharge. Negative for dysuria, flank pain, pelvic pain, vaginal bleeding and vaginal pain. Musculoskeletal: Negative for myalgias. Skin: Negative for rash. Hematological: Negative for adenopathy.        Vitals:    06/19/18 1618   BP: 135/80   Pulse: 99   Resp: 18   Temp: 97.8 °F (36.6 °C)   SpO2: 98%   Weight: 221 lb (100.2 kg)   Height: 5' 6\" (1.676 m)       Physical Exam   Constitutional: She appears well-developed and well-nourished. No distress. Genitourinary: Cervix exhibits discharge and friability. Cervix exhibits no motion tenderness. No erythema, tenderness or bleeding in the vagina. There is a foreign body (condom) in the vagina. No signs of injury around the vagina. Vaginal discharge found. Neurological: She is alert. Skin: She is not diaphoretic. Psychiatric: She has a normal mood and affect. Her behavior is normal. Judgment and thought content normal.   Nursing note and vitals reviewed. [facility]     ICD-10-CM ICD-9-CM    1. Foreign body in vagina, initial encounter T19. 2XXA 939.2      E915    2. Vaginal discharge N89.8 623.5 NUSWAB VAGINITIS PLUS     Medications Ordered Today   Medications    fluconazole (DIFLUCAN) 150 mg tablet     Sig: Take 1 Tab by mouth daily for 1 day. Dispense:  1 Tab     Refill:  0     The patients condition was discussed with the patient and they understand. The patient is to follow up with PCP/gyn. If signs and symptoms become worse the pt is to go to the ER. The patient is to take medications as prescribed. Foreign Body Removal  Date/Time: 6/19/2018 4:31 PM  Performed by: attendingPre-procedure re-eval: Immediately prior to the procedure, the patient was reevaluated and found suitable for the planned procedure and any planned medications. Removal mechanism: tenaculum. Post-procedure assessment: foreign body removed  Patient tolerance: Patient tolerated the procedure well with no immediate complications  My total time at bedside, performing this procedure was 1-15 minutes.

## 2018-06-22 LAB
A VAGINAE DNA VAG QL NAA+PROBE: NORMAL SCORE
BVAB2 DNA VAG QL NAA+PROBE: NORMAL SCORE
C ALBICANS DNA VAG QL NAA+PROBE: NEGATIVE
C GLABRATA DNA VAG QL NAA+PROBE: NEGATIVE
C TRACH RRNA SPEC QL NAA+PROBE: NEGATIVE
MEGA1 DNA VAG QL NAA+PROBE: NORMAL SCORE
N GONORRHOEA RRNA SPEC QL NAA+PROBE: NEGATIVE
T VAGINALIS RRNA SPEC QL NAA+PROBE: NEGATIVE

## 2018-08-28 ENCOUNTER — DOCUMENTATION ONLY (OUTPATIENT)
Dept: INTERNAL MEDICINE CLINIC | Age: 48
End: 2018-08-28

## 2018-11-27 ENCOUNTER — OFFICE VISIT (OUTPATIENT)
Dept: INTERNAL MEDICINE CLINIC | Age: 48
End: 2018-11-27

## 2018-11-27 VITALS
SYSTOLIC BLOOD PRESSURE: 136 MMHG | HEIGHT: 66 IN | BODY MASS INDEX: 34.49 KG/M2 | WEIGHT: 214.6 LBS | HEART RATE: 82 BPM | OXYGEN SATURATION: 97 % | RESPIRATION RATE: 18 BRPM | DIASTOLIC BLOOD PRESSURE: 74 MMHG | TEMPERATURE: 98.4 F

## 2018-11-27 DIAGNOSIS — Z23 ENCOUNTER FOR IMMUNIZATION: ICD-10-CM

## 2018-11-27 DIAGNOSIS — E66.9 OBESITY (BMI 30.0-34.9): ICD-10-CM

## 2018-11-27 DIAGNOSIS — G43.709 CHRONIC MIGRAINE WITHOUT AURA WITHOUT STATUS MIGRAINOSUS, NOT INTRACTABLE: ICD-10-CM

## 2018-11-27 DIAGNOSIS — Z79.4 TYPE 2 DIABETES MELLITUS WITHOUT COMPLICATION, WITH LONG-TERM CURRENT USE OF INSULIN (HCC): ICD-10-CM

## 2018-11-27 DIAGNOSIS — R42 VERTIGO: ICD-10-CM

## 2018-11-27 DIAGNOSIS — E11.9 TYPE 2 DIABETES MELLITUS WITHOUT COMPLICATION, WITH LONG-TERM CURRENT USE OF INSULIN (HCC): ICD-10-CM

## 2018-11-27 DIAGNOSIS — I10 ESSENTIAL HYPERTENSION: Primary | ICD-10-CM

## 2018-11-27 RX ORDER — EPINEPHRINE 0.3 MG/.3ML
0.3 INJECTION SUBCUTANEOUS
Qty: 2 SYRINGE | Status: SHIPPED | OUTPATIENT
Start: 2018-11-27 | End: 2018-11-27

## 2018-11-27 RX ORDER — MECLIZINE HCL 12.5 MG 12.5 MG/1
TABLET ORAL
Refills: 2 | COMMUNITY
Start: 2018-09-23 | End: 2018-11-27 | Stop reason: SDUPTHER

## 2018-11-27 RX ORDER — MECLIZINE HCL 12.5 MG 12.5 MG/1
12.5 TABLET ORAL
Qty: 30 TAB | Refills: 2 | Status: SHIPPED | OUTPATIENT
Start: 2018-11-27 | End: 2021-02-16 | Stop reason: ALTCHOICE

## 2018-11-27 NOTE — PROGRESS NOTES
Health Maintenance Due Topic Date Due  Pneumococcal 19-64 Medium Risk (1 of 1 - PPSV23) 05/26/1989  
 DTaP/Tdap/Td series (1 - Tdap) 05/26/1991  PAP AKA CERVICAL CYTOLOGY  05/26/1991  MICROALBUMIN Q1  02/28/2015  Influenza Age 5 to Adult  08/01/2018  HEMOGLOBIN A1C Q6M  08/07/2018  
 FOOT EXAM Q1  11/13/2018 Chief Complaint Patient presents with  Physical  
  Job related forms 1. Have you been to the ER, urgent care clinic since your last visit? Hospitalized since your last visit? Yes When: 10/1/18 Where: [facility] Reason for visit: Allergic reaction, throat closed up 2. Have you seen or consulted any other health care providers outside of the [address] [place] [address] since your last visit? Include any pap smears or colon screening. No 
 
3) Do you have an Advance Directive on file? no 
 
4) Are you interested in receiving information on Advance Directives? NO Patient is accompanied by self I have received verbal consent from [address] to discuss any/all medical information while they are present in the room.

## 2018-11-27 NOTE — PROGRESS NOTES
Niki Eng is a 50 y.o. female  who presents for routine immunizations. She denies any symptoms , reactions or allergies that would exclude them from being immunized today. Risks and adverse reactions were discussed and the VIS was given to them. All questions were addressed. She was observed for 10 min post injection. There were no reactions observed.  
 
Christianne Richards LPN

## 2018-11-27 NOTE — PROGRESS NOTES
HISTORY OF PRESENT ILLNESS Naomi Go is a 50 y.o. female. Pt. comes in for f/u. Has multiple medical problems. BP is stable. Followed by endocrinologist for DM. She is on glargine and Apidra. Has had recent episodes of hypoglycemia. A1c a few months ago was 8.3. Has chronic headaches and vertigo. Takes medications as needed. Followed by allergist for idiopathic urticaria. She is obese but has lost weight. Reports compliance with medications and diet. Med list and most recent labs/studies reviewed with pt. Trying to be active physically to control weight. Needs med refills. Reports no other new c/o. HPI Review of Systems Constitutional: Negative. HENT: Negative. Eyes: Negative for blurred vision. Respiratory: Negative for shortness of breath. Cardiovascular: Negative for chest pain and leg swelling. Gastrointestinal: Negative for abdominal pain. Genitourinary: Negative for dysuria. Musculoskeletal: Negative. Negative for joint pain. Skin: Negative. Neurological: Positive for sensory change and headaches. Negative for dizziness. Endo/Heme/Allergies: Negative for polydipsia. Psychiatric/Behavioral: The patient is nervous/anxious. All other systems reviewed and are negative. Physical Exam  
Constitutional: She is oriented to person, place, and time. She appears well-developed and well-nourished. No distress. Obese 
pleasant HENT:  
Head: Normocephalic and atraumatic. Mouth/Throat: Oropharynx is clear and moist.  
Eyes: Conjunctivae are normal. No scleral icterus. Neck: Normal range of motion. Neck supple. No JVD present. No thyromegaly present. Cardiovascular: Normal rate, regular rhythm, normal heart sounds and intact distal pulses. No murmur heard. Pulmonary/Chest: Effort normal and breath sounds normal. No respiratory distress. She has no wheezes. She has no rales. Abdominal: Soft. Bowel sounds are normal. She exhibits no distension. There is no tenderness. obese Musculoskeletal: She exhibits no edema or tenderness. Neurological: She is alert and oriented to person, place, and time. No cranial nerve deficit. Coordination normal.  
Skin: Skin is warm and dry. No rash noted. Psychiatric: She has a normal mood and affect. Her behavior is normal.  
Seems anxious Nursing note and vitals reviewed. ASSESSMENT and PLAN Diagnoses and all orders for this visit: 1. Essential hypertension 2. Encounter for immunization 
-     INFLUENZA VIRUS VAC QUAD,SPLIT,PRESV FREE SYRINGE IM 3. Type 2 diabetes mellitus without complication, with long-term current use of insulin ([facility] [address] 4. Chronic migraine without aura without status migrainosus, not intractable 5. Vertigo 6. Obesity (BMI 30.0-34.9) Other orders -     EPINEPHrine (EPIPEN 2-KERI) 0.3 mg/0.3 mL injection; 0.3 mL by IntraMUSCular route once as needed for up to 1 dose. -     meclizine (ANTIVERT) 12.5 mg tablet; Take 1 Tab by mouth daily as needed. Follow-up Disposition: 
Return in about 6 months (around 5/27/2019). lab results and schedule of future lab studies reviewed with patient 
reviewed diet, exercise and weight control 
reviewed medications and side effects in detail 
low cholesterol diet, weight control and daily exercise discussed, home glucose monitoring emphasized, all medications, side effects and compliance discussed carefully, foot care discussed and Podiatry visits discussed, annual eye examinations at Ophthalmology discussed, glycohemoglobin and other lab monitoring discussed and long term diabetic complications discussed F/u with other MD's as scheduled Form for health insurance filled and faxed Overall stable

## 2018-12-31 RX ORDER — TOPIRAMATE 50 MG/1
TABLET, FILM COATED ORAL
Qty: 60 TAB | Refills: 5 | Status: SHIPPED | OUTPATIENT
Start: 2018-12-31 | End: 2019-08-17 | Stop reason: SDUPTHER

## 2019-04-22 ENCOUNTER — HOSPITAL ENCOUNTER (OUTPATIENT)
Dept: MAMMOGRAPHY | Age: 49
Discharge: HOME | End: 2019-04-22
Attending: OBSTETRICS & GYNECOLOGY
Payer: COMMERCIAL

## 2019-04-22 ENCOUNTER — HOSPITAL ENCOUNTER (OUTPATIENT)
Dept: WOUND CARE | Age: 49
Discharge: HOME | End: 2019-04-22

## 2019-04-22 VITALS
RESPIRATION RATE: 16 BRPM | TEMPERATURE: 98 F | DIASTOLIC BLOOD PRESSURE: 80 MMHG | SYSTOLIC BLOOD PRESSURE: 123 MMHG | HEART RATE: 92 BPM

## 2019-04-22 DIAGNOSIS — Z12.31 VISIT FOR SCREENING MAMMOGRAM: ICD-10-CM

## 2019-04-22 PROCEDURE — 77063 BREAST TOMOSYNTHESIS BI: CPT

## 2019-06-18 ENCOUNTER — OFFICE VISIT (OUTPATIENT)
Dept: URGENT CARE | Age: 49
End: 2019-06-18

## 2019-06-18 VITALS
HEART RATE: 94 BPM | SYSTOLIC BLOOD PRESSURE: 119 MMHG | BODY MASS INDEX: 36.48 KG/M2 | HEIGHT: 66 IN | RESPIRATION RATE: 16 BRPM | WEIGHT: 227 LBS | DIASTOLIC BLOOD PRESSURE: 59 MMHG | TEMPERATURE: 98.4 F | OXYGEN SATURATION: 99 %

## 2019-06-18 DIAGNOSIS — B37.2 CANDIDAL INTERTRIGO: Primary | ICD-10-CM

## 2019-06-18 RX ORDER — NYSTATIN 100000 [USP'U]/G
POWDER TOPICAL 2 TIMES DAILY
Qty: 60 G | Refills: 0 | Status: SHIPPED | OUTPATIENT
Start: 2019-06-18 | End: 2019-07-02

## 2019-06-18 NOTE — PATIENT INSTRUCTIONS
Follow up with PCP without improvement over next 10-14 days sooner/immediately for new or worsening       Yeast Skin Infection: Care Instructions  Your Care Instructions    Yeast normally lives on your skin. Sometimes too much yeast can overgrow in certain areas of the skin and cause an infection. The infection causes red, scaly, moist patches on your skin that may itch. Common areas for skin yeast infections are skin folds under the breasts or belly area. The warm and moist areas in the skin folds can make it easier for yeast to overgrow. Yeast infections also can be found on other parts of the body such as the groin or armpits. You will probably get a cream or ointment that contains an antifungal medicine. Examples of these are miconazole and clotrimazole. You put it on your skin to treat the infection. Your doctor may give you a prescription for the cream or ointment. Or you may be able to buy it without a prescription at most drugstores. If the infection is severe, the doctor will prescribe antifungal pills. A yeast infection usually goes away after about a week of treatment. But it's important to use the medicine for as long as your doctor tells you to. Follow-up care is a key part of your treatment and safety. Be sure to make and go to all appointments, and call your doctor if you are having problems. It's also a good idea to know your test results and keep a list of the medicines you take. How can you care for yourself at home? · Be safe with medicines. Take your medicines exactly as prescribed. Call your doctor if you think you are having a problem with your medicine. · Keep your skin clean and dry. Your doctor may suggest using powder that contains an antifungal medicine in the skin folds. · Wear loose clothing. When should you call for help?   Call your doctor now or seek immediate medical care if:    · You have symptoms of infection, such as:  ? Increased pain, swelling, warmth, or redness. ? Red streaks leading from the area. ? Pus draining from the area. ? A fever.    Watch closely for changes in your health, and be sure to contact your doctor if:    · You do not get better as expected. Where can you learn more? Go to http://gallito-candace.info/. Enter T312 in the search box to learn more about \"Yeast Skin Infection: Care Instructions. \"  Current as of: April 17, 2018  Content Version: 11.9  © 2966-3993 [employer]. Care instructions adapted under license by [employer] (which disclaims liability or warranty for this information). If you have questions about a medical condition or this instruction, always ask your healthcare professional. [address] any warranty or liability for your use of this information.

## 2019-06-18 NOTE — PROGRESS NOTES
53 yo with history of diabetes here for rash under left breast that is very itchy  Present for 1 week and not getting better with OTC powder. She feels well otherwise and denies any fever, chills or pain to this area. No known exposure to any new products or metals here in this area. Overall worsening.                Past Medical History:   Diagnosis Date    Benign pancreatic islet cell tumors     2001    Diabetes ([address]     Migraine     Thyroid disease     hyperthyroid        Past Surgical History:   Procedure Laterality Date    ABDOMEN SURGERY PROC UNLISTED      HX BREAST REDUCTION Bilateral 2004    HX SPLENECTOMY      2002         Family History   Problem Relation Age of Onset    Diabetes Mother     Elevated Lipids Mother     Hypertension Mother    [place] Harder Arthritis-rheumatoid Mother     Diabetes Father     Stroke Father         Social History     Socioeconomic History    Marital status: SINGLE     Spouse name: Not on file    Number of children: Not on file    Years of education: Not on file    Highest education level: Not on file   Occupational History    Not on file   Social Needs    Financial resource strain: Not on file    Food insecurity:     Worry: Not on file     Inability: Not on file    Transportation needs:     Medical: Not on file     Non-medical: Not on file   Tobacco Use    Smoking status: Never Smoker    Smokeless tobacco: Never Used   Substance and Sexual Activity    Alcohol use: No    Drug use: No    Sexual activity: Never     Comment: single has one child   Lifestyle    Physical activity:     Days per week: Not on file     Minutes per session: Not on file    Stress: Not on file   Relationships    Social connections:     Talks on phone: Not on file     Gets together: Not on file     Attends Voodoo service: Not on file     Active member of club or organization: Not on file     Attends meetings of clubs or organizations: Not on file     Relationship status: Not on file  Intimate partner violence:     Fear of current or ex partner: Not on file     Emotionally abused: Not on file     Physically abused: Not on file     Forced sexual activity: Not on file   Other Topics Concern    Not on file   Social History Narrative    Not on file                ALLERGIES: Bee sting [sting, bee]; Fish containing products; Lamisil [terbinafine]; Lisinopril; and Shellfish derived    Review of Systems   All other systems reviewed and are negative. Vitals:    06/18/19 1457   BP: 119/59   Pulse: 94   Resp: 16   Temp: 98.4 °F (36.9 °C)   SpO2: 99%   Weight: 227 lb (103 kg)   Height: 5' 6\" (1.676 m)       Physical Exam   Constitutional: She is oriented to person, place, and time. No distress. HENT:   Head: Atraumatic. Eyes: EOM are normal.   Cardiovascular: Normal rate, regular rhythm and normal heart sounds. Pulmonary/Chest: Effort normal and breath sounds normal.       Neurological: She is alert and oriented to person, place, and time. Skin: She is not diaphoretic. Psychiatric: She has a normal mood and affect. Her behavior is normal. Thought content normal.       MDM     Differential Diagnosis; Clinical Impression; Plan:       CLINICAL IMPRESSION:  (B37.2) Candidal intertrigo  (primary encounter diagnosis)    Orders Placed This Encounter      nystatin (MYCOSTATIN) powder          Sig: Apply  to affected area two (2) times a day for 14 days. Dispense:  60 g          Refill:  0      Plan:  Suspect yeast dermatitis  Start nystatin  Advised PCP re eval in 10-14 days if not improved, sooner/immediately for new, worsening or changes. Review handouts. Keep area dry. We have reviewed concerning signs/symptoms, normal vs abnormal progression of medical condition and when to seek immediate medical attention.             Procedures

## 2019-08-12 RX ORDER — EPINEPHRINE 0.3 MG/.3ML
0.3 INJECTION SUBCUTANEOUS
Qty: 2 SYRINGE | OUTPATIENT
Start: 2019-08-12 | End: 2019-08-12

## 2020-01-06 ENCOUNTER — TELEPHONE (OUTPATIENT)
Dept: INTERNAL MEDICINE CLINIC | Age: 50
End: 2020-01-06

## 2020-01-06 ENCOUNTER — OFFICE VISIT (OUTPATIENT)
Dept: INTERNAL MEDICINE CLINIC | Age: 50
End: 2020-01-06

## 2020-01-06 VITALS
RESPIRATION RATE: 18 BRPM | HEIGHT: 66 IN | OXYGEN SATURATION: 99 % | DIASTOLIC BLOOD PRESSURE: 78 MMHG | WEIGHT: 227 LBS | HEART RATE: 97 BPM | SYSTOLIC BLOOD PRESSURE: 128 MMHG | BODY MASS INDEX: 36.48 KG/M2 | TEMPERATURE: 98 F

## 2020-01-06 DIAGNOSIS — I10 ESSENTIAL HYPERTENSION: ICD-10-CM

## 2020-01-06 DIAGNOSIS — Z23 ENCOUNTER FOR IMMUNIZATION: ICD-10-CM

## 2020-01-06 DIAGNOSIS — D50.9 IRON DEFICIENCY ANEMIA, UNSPECIFIED IRON DEFICIENCY ANEMIA TYPE: ICD-10-CM

## 2020-01-06 DIAGNOSIS — Z78.9 ALLERGY HISTORY UNKNOWN: ICD-10-CM

## 2020-01-06 DIAGNOSIS — Z01.818 PREOP EXAMINATION: ICD-10-CM

## 2020-01-06 DIAGNOSIS — G43.001 MIGRAINE WITHOUT AURA AND WITH STATUS MIGRAINOSUS, NOT INTRACTABLE: ICD-10-CM

## 2020-01-06 DIAGNOSIS — R07.9 CHEST PAIN AT REST: Primary | ICD-10-CM

## 2020-01-06 LAB
ALBUMIN SERPL-MCNC: 4 G/DL (ref 3.5–5.5)
ALBUMIN/GLOB SERPL: 1.3 {RATIO} (ref 1.2–2.2)
ALP SERPL-CCNC: 40 IU/L (ref 39–117)
ALT SERPL-CCNC: 17 IU/L (ref 0–32)
AST SERPL-CCNC: 20 IU/L (ref 0–40)
BASOPHILS # BLD AUTO: 0 X10E3/UL (ref 0–0.2)
BASOPHILS NFR BLD AUTO: 0 %
BILIRUB SERPL-MCNC: 0.3 MG/DL (ref 0–1.2)
BUN SERPL-MCNC: 7 MG/DL (ref 6–24)
BUN/CREAT SERPL: 9 (ref 9–23)
CALCIUM SERPL-MCNC: 9.6 MG/DL (ref 8.7–10.2)
CHLORIDE SERPL-SCNC: 105 MMOL/L (ref 96–106)
CO2 SERPL-SCNC: 23 MMOL/L (ref 20–29)
CREAT SERPL-MCNC: 0.76 MG/DL (ref 0.57–1)
EOSINOPHIL # BLD AUTO: 0 X10E3/UL (ref 0–0.4)
EOSINOPHIL NFR BLD AUTO: 0 %
ERYTHROCYTE [DISTWIDTH] IN BLOOD BY AUTOMATED COUNT: 15.2 % (ref 11.7–15.4)
GLOBULIN SER CALC-MCNC: 3.1 G/DL (ref 1.5–4.5)
GLUCOSE SERPL-MCNC: 129 MG/DL (ref 65–99)
HCT VFR BLD AUTO: 34 % (ref 34–46.6)
HGB BLD-MCNC: 11.1 G/DL (ref 11.1–15.9)
IMM GRANULOCYTES # BLD AUTO: 0 X10E3/UL (ref 0–0.1)
IMM GRANULOCYTES NFR BLD AUTO: 0 %
LYMPHOCYTES # BLD AUTO: 3 X10E3/UL (ref 0.7–3.1)
LYMPHOCYTES NFR BLD AUTO: 32 %
MCH RBC QN AUTO: 26.6 PG (ref 26.6–33)
MCHC RBC AUTO-ENTMCNC: 32.6 G/DL (ref 31.5–35.7)
MCV RBC AUTO: 82 FL (ref 79–97)
MONOCYTES # BLD AUTO: 0.5 X10E3/UL (ref 0.1–0.9)
MONOCYTES NFR BLD AUTO: 5 %
NEUTROPHILS # BLD AUTO: 5.9 X10E3/UL (ref 1.4–7)
NEUTROPHILS NFR BLD AUTO: 63 %
PLATELET # BLD AUTO: 570 X10E3/UL (ref 150–450)
POTASSIUM SERPL-SCNC: 4.2 MMOL/L (ref 3.5–5.2)
PROT SERPL-MCNC: 7.1 G/DL (ref 6–8.5)
RBC # BLD AUTO: 4.17 X10E6/UL (ref 3.77–5.28)
SODIUM SERPL-SCNC: 141 MMOL/L (ref 134–144)
WBC # BLD AUTO: 9.4 X10E3/UL (ref 3.4–10.8)

## 2020-01-06 RX ORDER — EPINEPHRINE 0.3 MG/.3ML
0.3 INJECTION SUBCUTANEOUS
Qty: 1 SYRINGE | Refills: 1 | Status: SHIPPED | OUTPATIENT
Start: 2020-01-06 | End: 2020-01-06

## 2020-01-06 RX ORDER — MELATONIN
DAILY
COMMUNITY

## 2020-01-06 RX ORDER — TOPIRAMATE 50 MG/1
TABLET, FILM COATED ORAL
Qty: 60 TAB | Refills: 0 | Status: SHIPPED | OUTPATIENT
Start: 2020-01-06 | End: 2020-01-29

## 2020-01-06 NOTE — PATIENT INSTRUCTIONS
Vaccine Information Statement    Influenza (Flu) Vaccine (Inactivated or Recombinant): What You Need to Know    Many Vaccine Information Statements are available in Italian and other languages. See www.immunize.org/vis  Hojas de información sobre vacunas están disponibles en español y en muchos otros idiomas. Visite www.immunize.org/vis    1. Why get vaccinated? Influenza vaccine can prevent influenza (flu). Flu is a contagious disease that spreads around the [facility] every year, usually between October and May. Anyone can get the flu, but it is more dangerous for some people. Infants and young children, people 72years of age and older, pregnant women, and people with certain health conditions or a weakened immune system are at greatest risk of flu complications. Pneumonia, bronchitis, sinus infections and ear infections are examples of flu-related complications. If you have a medical condition, such as heart disease, cancer or diabetes, flu can make it worse. Flu can cause fever and chills, sore throat, muscle aches, fatigue, cough, headache, and runny or stuffy nose. Some people may have vomiting and diarrhea, though this is more common in children than adults. Each year thousands of people in the [facility] die from flu, and many more are hospitalized. Flu vaccine prevents millions of illnesses and flu-related visits to the doctor each year. 2. Influenza vaccines     CDC recommends everyone 10months of age and older get vaccinated every flu season. Children 6 months through 6years of age may need 2 doses during a single flu season. Everyone else needs only 1 dose each flu season. It takes about 2 weeks for protection to develop after vaccination. There are many flu viruses, and they are always changing. Each year a new flu vaccine is made to protect against three or four viruses that are likely to cause disease in the upcoming flu season.  Even when the vaccine doesnt exactly match these viruses, it may still provide some protection. Influenza vaccine does not cause flu. Influenza vaccine may be given at the same time as other vaccines. 3. Talk with your health care provider    Tell your vaccine provider if the person getting the vaccine:   Has had an allergic reaction after a previous dose of influenza vaccine, or has any severe, life-threatening allergies.  Has ever had Guillain-Barré Syndrome (also called GBS). In some cases, your health care provider may decide to postpone influenza vaccination to a future visit. People with minor illnesses, such as a cold, may be vaccinated. People who are moderately or severely ill should usually wait until they recover before getting influenza vaccine. Your health care provider can give you more information. 4. Risks of a reaction     Soreness, redness, and swelling where shot is given, fever, muscle aches, and headache can happen after influenza vaccine.  There may be a very small increased risk of Guillain-Barré Syndrome (GBS) after inactivated influenza vaccine (the flu shot). [facility] children who get the flu shot along with pneumococcal vaccine (PCV13), and/or DTaP vaccine at the same time might be slightly more likely to have a seizure caused by fever. Tell your health care provider if a child who is getting flu vaccine has ever had a seizure. People sometimes faint after medical procedures, including vaccination. Tell your provider if you feel dizzy or have vision changes or ringing in the ears. As with any medicine, there is a very remote chance of a vaccine causing a severe allergic reaction, other serious injury, or death. 5. What if there is a serious problem? An allergic reaction could occur after the vaccinated person leaves the clinic.  If you see signs of a severe allergic reaction (hives, swelling of the face and throat, difficulty breathing, a fast heartbeat, dizziness, or weakness), call 9-1-1 and get the person to the nearest hospital.    For other signs that concern you, call your health care provider. Adverse reactions should be reported to the Vaccine Adverse Event Reporting System (VAERS). Your health care provider will usually file this report, or you can do it yourself. Visit the VAERS website at www.vaers. [facility].gov or call 8-256.585.8117. VAERS is only for reporting reactions, and VAERS staff do not give medical advice. 6. The National Vaccine Injury Compensation Program    The [facility] Vaccine Injury Compensation Program (VICP) is a federal program that was created to compensate people who may have been injured by certain vaccines. Visit the VICP website at www.[facility].gov/vaccinecompensation or call 3-342.494.6070 to learn about the program and about filing a claim. There is a time limit to file a claim for compensation. 7. How can I learn more?  Ask your health care provider.  Call your local or state health department.  Contact the Centers for Disease Control and Prevention (CDC):  - Call 1-639.646.3733 (9-887-SNY-INFO) or  - Visit CDCs influenza website at www.cdc.gov/flu    Vaccine Information Statement (Interim)  Inactivated Influenza Vaccine   8/15/2019  42 PATTIE Andrade [id number]   Department of Health and Human Services  Centers for Disease Control and Prevention    Office Use Only

## 2020-01-06 NOTE — PROGRESS NOTES
Health Maintenance Due   Topic Date Due    DTaP/Tdap/Td series (1 - Tdap) 05/26/1981    PAP AKA CERVICAL CYTOLOGY  05/26/1991    Pneumococcal 0-64 years (1 of 1 - PPSV23) 01/03/2017    HEMOGLOBIN A1C Q6M  02/08/2019    Influenza Age 9 to Adult  08/01/2019    MICROALBUMIN Q1  08/08/2019    LIPID PANEL Q1  08/08/2019    FOOT EXAM Q1  11/27/2019       Chief Complaint   Patient presents with    Chest Pain     upper chest few weeks, states at times it feels like a muscle, intermittent, states no radiating       1. Have you been to the ER, urgent care clinic since your last visit? Hospitalized since your last visit? No    2. Have you seen or consulted any other health care providers outside of the [address] [place] [address] since your last visit? Include any pap smears or colon screening. No    3) Do you have an Advance Directive on file? no    4) Are you interested in receiving information on Advance Directives? NO      Patient is accompanied by self I have received verbal consent from Rajwinder [address] to discuss any/all medical information while they are present in the room. Rajwinder [address] is a 52 y.o. female  who presents for routine immunization(s). Patient denies any symptoms , reactions or allergies that would exclude them from being immunized today. Risks and adverse reactions were discussed and the VIS was given to them. Patient voiced full understanding and signed Adult Immunization Consent form. All questions were addressed. Patient was observed for 10 min post injection. There were no reactions observed.     Hardy Hobbs LPN

## 2020-01-06 NOTE — TELEPHONE ENCOUNTER
Pt is scheduled for surgery tomorrow.  Dr Vinny Ramos needs last ov notes and most recent ekg and labs faxed to [phone] today

## 2020-01-06 NOTE — PROGRESS NOTES
HISTORY OF PRESENT ILLNESS  Seble Zuniga is a 52 y.o. female with a history of HTN, anxiety, Graves and vertigo. That presents today for a pre op evaluation, chest pressure, medication refill. Patient states that she is due to have shoulder surgery tomorrow with Dr. Gordon Barros. Office is asking for a CMP and EKG to be done for the chest discomfort. Patient reports that the pain has been on and off for the last several weeks. She explains it as waxing and waning. She points right the the sternum of the chest and states that it can radiate down her left arm at times. She states that it has stopped her from doing what she is completing at the time. She reports that time just makes it better. Describes the pain a 7/10 at times that comes and goes. Has not taken anything for it. Is on aspirin 81 mg daily   Denies SOB, swelling, reflux, GI or  concerns   States that she has had upset stomach, but has been having that for awhile. Denies nausea, or vomiting. Denies blood in the stool.  Appetite is good and stays hydrated   Is taking all prescribed medication as directed  Would like the flu shot   Would like a a refill on her Epipen    Visit Vitals  /78 (BP 1 Location: Left arm, BP Patient Position: Sitting)   Pulse 97   Temp 98 °F (36.7 °C) (Oral)   Resp 18   Ht 5' 6\" (1.676 m)   Wt 227 lb (103 kg)   LMP 12/06/2019   SpO2 99%   BMI 36.64 kg/m²     Past Medical History:   Diagnosis Date    Benign pancreatic islet cell tumors     2001    Diabetes ([address]     Migraine     Thyroid disease     hyperthyroid     Past Surgical History:   Procedure Laterality Date    ABDOMEN SURGERY PROC UNLISTED      HX BREAST REDUCTION Bilateral 2004    HX SPLENECTOMY      2002     Family History   Problem Relation Age of Onset    Diabetes Mother     Elevated Lipids Mother     Hypertension Mother     Arthritis-rheumatoid Mother     Diabetes Father     Stroke Father      Outpatient Encounter Medications as of 1/6/2020 Medication Sig Dispense Refill    cholecalciferol (VITAMIN D3) (1000 Units /25 mcg) tablet Take  by mouth daily.  EPINEPHrine (EPIPEN) 0.3 mg/0.3 mL injection 0.3 mL by IntraMUSCular route once as needed for Allergic Response for up to 1 dose. 1 Syringe 1    topiramate (TOPAMAX) 50 mg tablet TAKE 1 TABLET BY MOUTH TWICE A DAY 60 Tab 0    meclizine (ANTIVERT) 12.5 mg tablet Take 1 Tab by mouth daily as needed. 30 Tab 2    amLODIPine (NORVASC) 10 mg tablet TAKE 1 TABLET BY MOUTH EVERY DAY  3    fexofenadine (ALLEGRA) 180 mg tablet Take  by mouth.  SUMAtriptan (IMITREX) 100 mg tablet TAKE 1 TABLET EVERY DAY AS NEEDED MIGRAINE 9 Tab 5    BD INSULIN PEN NEEDLE UF MINI 31 gauge x 3/16\" ndle USE AS DIRECTED FOR INSULIN INJECTIONS,FOUR TIMES A DAY SUBCUTANEOUSLY FOR 30 DAYS 30  11    levothyroxine (SYNTHROID) 125 mcg tablet TAKE 1 TABLET EVERY DAY  2    diphenhydrAMINE (BENADRYL) 25 mg capsule Take 25 mg by mouth every six (6) hours as needed.  Liraglutide (VICTOZA) 0.6 mg/0.1 mL (18 mg/3 mL) sub-q pen by SubCUTAneous route.  glucose blood VI test strips (ONE TOUCH ULTRA TEST) strip by Does Not Apply route See Admin Instructions.  insulin glargine (LANTUS) 100 unit/mL injection 46 Units by SubCUTAneous route once.  sitagliptin-metformin (JANUMET) 50-1,000 mg per tablet 1BIDCM - TAKE ONE TABLET BY MOUTH TWICE DAILY WITH MEALS 60 Tab 0    Insulin Needles, Disposable, (BD INSULIN PEN NEEDLE UF SHORT) 31 X 5/16 \" Ndle UAD - USE AS DIRECTED 100 Package 5    aspirin delayed-release 81 mg tablet Take 81 mg by mouth daily.  [DISCONTINUED] topiramate (TOPAMAX) 50 mg tablet TAKE 1 TABLET BY MOUTH TWICE A DAY 60 Tab 0    diclofenac EC (VOLTAREN) 75 mg EC tablet TAKE 1 (ONE) TABLET BY MOUTH TWO TIMES DAILY, TAKE WITH FOOD  1    [DISCONTINUED] aspirin-calcium carbonate 81 mg-300 mg calcium(777 mg) tab Take 81 mg by mouth.  famotidine (PEPCID) 40 mg tablet Take 40 mg by mouth daily.  APIDRA SOLOSTAR 100 unit/mL pen INJECT 11 UNITS PLUS 2 UNITS FOR EVERY 25PTS > 100 WITH MEALS THREE TIMES A DAY SUBCUTANEOUS 30  2    desogestrel-ethinyl estradiol (DESOGEN) 0.15-0.03 mg per tablet Take 1 Tab by mouth daily.  [DISCONTINUED] atenolol (TENORMIN) 50 mg tablet 1QD - TAKE ONE TABLET BY MOUTH EVERY DAY 30 Tab 0     No facility-administered encounter medications on file as of 1/6/2020. Evan Foreman HPI    Review of Systems   Constitutional: Negative. HENT: Negative. Eyes: Negative for blurred vision and double vision. Respiratory: Negative for cough and shortness of breath. Cardiovascular: Positive for chest pain. Negative for palpitations and leg swelling. Gastrointestinal: Positive for diarrhea. Negative for blood in stool, heartburn and vomiting. Genitourinary: Negative. Musculoskeletal: Positive for joint pain. Negative for falls. Neurological: Negative. Endo/Heme/Allergies: Negative. Psychiatric/Behavioral: Negative. Physical Exam  Vitals signs and nursing note reviewed. Constitutional:       Appearance: She is obese. HENT:      Right Ear: Tympanic membrane and ear canal normal.      Left Ear: Tympanic membrane and ear canal normal.      Nose: Nose normal.   Eyes:      Pupils: Pupils are equal, round, and reactive to light. Neck:      Musculoskeletal: Neck supple. Cardiovascular:      Rate and Rhythm: Normal rate. Pulses: Normal pulses. Heart sounds: Normal heart sounds. Pulmonary:      Effort: Pulmonary effort is normal.      Breath sounds: Normal breath sounds. No wheezing. Abdominal:      General: Bowel sounds are normal. There is no distension. Palpations: Abdomen is soft. Musculoskeletal:         General: Tenderness present. Right shoulder: She exhibits tenderness, pain and spasm. Skin:     General: Skin is warm. Neurological:      General: No focal deficit present.       Mental Status: She is alert and oriented to person, place, and time. Psychiatric:         Mood and Affect: Mood normal.         ASSESSMENT and PLAN  Diagnoses and all orders for this visit:    1. Chest pain at rest  -     AMB POC EKG ROUTINE W/ 12 LEADS, INTER & REP    2. Preop examination  -     METABOLIC PANEL, COMPREHENSIVE  -     CBC WITH AUTOMATED DIFF    3. Allergy history unknown  -     EPINEPHrine (EPIPEN) 0.3 mg/0.3 mL injection; 0.3 mL by IntraMUSCular route once as needed for Allergic Response for up to 1 dose. 4. Migraine without aura and with status migrainosus, not intractable  -     topiramate (TOPAMAX) 50 mg tablet; TAKE 1 TABLET BY MOUTH TWICE A DAY    5. Encounter for immunization  -     INFLUENZA VIRUS VAC QUAD,SPLIT,PRESV FREE SYRINGE IM  -     DE IMMUNIZ ADMIN,1 SINGLE/COMB VAC/TOXOID    6. Essential hypertension      Follow-up and Dispositions    · Return if symptoms worsen or fail to improve.        current treatment plan is effective, no change in therapy  lab results and schedule of future lab studies reviewed with patient  reviewed diet, exercise and weight control  cardiovascular risk and specific lipid/LDL goals reviewed  reviewed medications and side effects in detail

## 2020-01-07 DIAGNOSIS — R79.89 ELEVATED PLATELET COUNT: Primary | ICD-10-CM

## 2020-01-07 NOTE — PROGRESS NOTES
Random glucose elevated. Will need to monitor sugar and carbohydrate intake   Platelet level elevated count, higher than previous.  Add an iron and ferritin level

## 2020-01-08 LAB
FERRITIN SERPL-MCNC: 7 NG/ML (ref 15–150)
IRON SATN MFR SERPL: 6 % (ref 15–55)
IRON SERPL-MCNC: 30 UG/DL (ref 27–159)
TIBC SERPL-MCNC: 508 UG/DL (ref 250–450)
UIBC SERPL-MCNC: 478 UG/DL (ref 131–425)

## 2020-01-09 RX ORDER — FERROUS GLUCONATE 324(37.5)
324 TABLET ORAL
Qty: 90 TAB | Refills: 1 | Status: SHIPPED | OUTPATIENT
Start: 2020-01-09 | End: 2020-07-21

## 2020-01-09 NOTE — PROGRESS NOTES
Looks like anemia. Will begin iron replacement. Sent to her pharmacy. Platelet count high, did she have surgery?  Needs to monitor for bleeding

## 2020-01-10 NOTE — PROGRESS NOTES
Called [facility] and requested an AOT for a HgbA1c Test Code #[id number]. Per [facility] rep a fax has been sent to be filled out to fax back to perform the AOT. No further questions at this time.

## 2020-01-10 NOTE — PROGRESS NOTES
Fax received from [facility] which was completed and faxed back today, 1/10/2020. Confirmation received that faxed transmitted successfully. Per the rep if unable to run the AOT, [facility] will call the office to verbally tell the nursing staff. Otherwise results will be sent when test is completed.

## 2020-01-13 LAB — HBA1C MFR BLD: 8 % (ref 4.8–5.6)

## 2020-01-13 NOTE — PROGRESS NOTES
A1c trending down. Continue on medication. Please monitor sugar, and carbohydrates.  Exercise 3-4 times a day

## 2020-01-29 DIAGNOSIS — G43.001 MIGRAINE WITHOUT AURA AND WITH STATUS MIGRAINOSUS, NOT INTRACTABLE: ICD-10-CM

## 2020-01-29 RX ORDER — TOPIRAMATE 50 MG/1
TABLET, FILM COATED ORAL
Qty: 60 TAB | Refills: 0 | Status: SHIPPED | OUTPATIENT
Start: 2020-01-29 | End: 2020-03-18

## 2020-02-19 ENCOUNTER — TELEPHONE (OUTPATIENT)
Dept: INTERNAL MEDICINE CLINIC | Age: 50
End: 2020-02-19

## 2020-02-19 NOTE — PROGRESS NOTES
Attempted to contact patient voicemail is full and is unable to leave message, will send results and recommendations in letter form

## 2020-03-18 DIAGNOSIS — G43.001 MIGRAINE WITHOUT AURA AND WITH STATUS MIGRAINOSUS, NOT INTRACTABLE: ICD-10-CM

## 2020-03-18 RX ORDER — TOPIRAMATE 50 MG/1
TABLET, FILM COATED ORAL
Qty: 60 TAB | Refills: 0 | Status: SHIPPED | OUTPATIENT
Start: 2020-03-18 | End: 2020-04-13 | Stop reason: SDUPTHER

## 2020-04-13 DIAGNOSIS — G43.001 MIGRAINE WITHOUT AURA AND WITH STATUS MIGRAINOSUS, NOT INTRACTABLE: ICD-10-CM

## 2020-04-13 RX ORDER — TOPIRAMATE 50 MG/1
TABLET, FILM COATED ORAL
Qty: 60 TAB | Refills: 0 | Status: SHIPPED | OUTPATIENT
Start: 2020-04-13 | End: 2020-06-02

## 2020-05-19 ENCOUNTER — HOSPITAL ENCOUNTER (OUTPATIENT)
Dept: MAMMOGRAPHY | Age: 50
Discharge: HOME | End: 2020-05-19
Attending: INTERNAL MEDICINE
Payer: COMMERCIAL

## 2020-05-19 DIAGNOSIS — Z12.31 VISIT FOR SCREENING MAMMOGRAM: ICD-10-CM

## 2020-05-19 PROCEDURE — 77063 BREAST TOMOSYNTHESIS BI: CPT

## 2020-05-30 DIAGNOSIS — G43.001 MIGRAINE WITHOUT AURA AND WITH STATUS MIGRAINOSUS, NOT INTRACTABLE: ICD-10-CM

## 2020-06-02 ENCOUNTER — PATIENT MESSAGE (OUTPATIENT)
Dept: INTERNAL MEDICINE CLINIC | Age: 50
End: 2020-06-02

## 2020-06-02 RX ORDER — TOPIRAMATE 50 MG/1
TABLET, FILM COATED ORAL
Qty: 60 TAB | Refills: 0 | Status: SHIPPED | OUTPATIENT
Start: 2020-06-02 | End: 2020-07-02

## 2020-07-01 DIAGNOSIS — G43.001 MIGRAINE WITHOUT AURA AND WITH STATUS MIGRAINOSUS, NOT INTRACTABLE: ICD-10-CM

## 2020-07-02 RX ORDER — TOPIRAMATE 50 MG/1
TABLET, FILM COATED ORAL
Qty: 60 TAB | Refills: 0 | Status: SHIPPED | OUTPATIENT
Start: 2020-07-02 | End: 2020-07-20

## 2020-07-13 ENCOUNTER — VIRTUAL VISIT (OUTPATIENT)
Dept: INTERNAL MEDICINE CLINIC | Age: 50
End: 2020-07-13

## 2020-07-13 DIAGNOSIS — Z12.11 SCREEN FOR COLON CANCER: ICD-10-CM

## 2020-07-13 DIAGNOSIS — G43.009 MIGRAINE WITHOUT AURA AND WITHOUT STATUS MIGRAINOSUS, NOT INTRACTABLE: ICD-10-CM

## 2020-07-13 DIAGNOSIS — E11.9 TYPE 2 DIABETES MELLITUS WITHOUT COMPLICATION, WITH LONG-TERM CURRENT USE OF INSULIN (HCC): Primary | ICD-10-CM

## 2020-07-13 DIAGNOSIS — E66.9 OBESITY (BMI 30.0-34.9): ICD-10-CM

## 2020-07-13 DIAGNOSIS — Z79.4 TYPE 2 DIABETES MELLITUS WITHOUT COMPLICATION, WITH LONG-TERM CURRENT USE OF INSULIN (HCC): Primary | ICD-10-CM

## 2020-07-13 DIAGNOSIS — I10 ESSENTIAL HYPERTENSION: ICD-10-CM

## 2020-07-13 RX ORDER — LIRAGLUTIDE 6 MG/ML
INJECTION SUBCUTANEOUS
COMMUNITY

## 2020-07-13 RX ORDER — INSULIN GLARGINE 100 [IU]/ML
INJECTION, SOLUTION SUBCUTANEOUS
COMMUNITY
Start: 2020-05-27 | End: 2022-09-08

## 2020-07-13 NOTE — PROGRESS NOTES
Agustin Elizondo is a 48 y.o. female who was seen by synchronous (real-time) audio-video technology on 7/13/2020 for Hypertension (f/u ); Diabetes; Anxiety; and Obesity        Assessment & Plan:   Diagnoses and all orders for this visit:    1. Type 2 diabetes mellitus without complication, with long-term current use of insulin ([facility] [address]    2. Essential hypertension    3. Obesity (BMI 30.0-34.9)    4. Migraine without aura and without status migrainosus, not intractable  -     REFERRAL TO GASTROENTEROLOGY    5. Screen for colon cancer  -     REFERRAL TO NEUROLOGY        I spent at least 25 minutes on this visit with this established patient. Subjective:     Patient seen virtually for follow-up. Has a few chronic medical issues including DM, HTN, obesity, migraine headaches, hypothyroidism, obesity, allergies. Continues have headaches. Remains on Topamax. Taking Imitrex but getting only 9 monthly treatments. Excedrin Migraine helps but tells me is not available anymore. Followed by endocrinologist for diabetes. Reports numbers being stable. Denies vision changes or neuropathy issues. A1c 6 months ago was 8.0. Tells me it is 7.0 now. Med list and most recent studies reviewed. Trying to watch her diet. She is obese with BMI over 35. Her brother passed away recently. She has been stressed and grieving. Taking precautions regarding COVID-19. Denies any related symptoms including fever, cough, dyspnea, chest pain. Needs a colonoscopy. No other new complaints.       Plan:  Continue current meds  F/u with other MD's as scheduled  Referral for colonoscopy  Refer to neurologist for migraine headaches  Advised patient to lose weight by watching diet (decreasing sugars/carbs/fat, increasing fruits/vegetables), exercising at least 30 minutes daily, getting 7-8 hours of sleep daily, drinking plenty of water, and decreasing stress  COVID-19 precautions discussed with pt  Follow-up 6 months or as needed  Prior to Admission medications    Medication Sig Start Date End Date Taking? Authorizing Provider   Edna Guzman U-100 Insulin 100 unit/mL (3 mL) inpn  20  Yes Provider, Historical   ferrous gluconate 324 mg (37.5 mg iron) tablet Take 1 Tab by mouth Daily (before breakfast). 20  Yes Cari Sheets, NP   cholecalciferol (VITAMIN D3) (1000 Units /25 mcg) tablet Take  by mouth daily. Yes Provider, Historical   amLODIPine (NORVASC) 10 mg tablet TAKE 1 TABLET BY MOUTH EVERY DAY 18  Yes Provider, Historical   famotidine (PEPCID) 40 mg tablet Take 40 mg by mouth daily. Yes Provider, Historical   fexofenadine (ALLEGRA) 180 mg tablet Take  by mouth. Yes Provider, Historical   BD INSULIN PEN NEEDLE UF MINI 31 gauge x 3/16\" ndle USE AS DIRECTED FOR INSULIN INJECTIONS,FOUR TIMES A DAY SUBCUTANEOUSLY FOR 30 DAYS 30 3/25/17  Yes Provider, Historical   levothyroxine (SYNTHROID) 125 mcg tablet TAKE 1 TABLET EVERY DAY 10/20/16  Yes Provider, Historical   Liraglutide (VICTOZA) 0.6 mg/0.1 mL (18 mg/3 mL) sub-q pen by SubCUTAneous route. Yes Provider, Historical   glucose blood VI test strips (ONE TOUCH ULTRA TEST) strip by Does Not Apply route See Admin Instructions. Yes Provider, Historical   sitagliptin-metformin (JANUMET) 50-1,000 mg per tablet 1BIDCM - TAKE ONE TABLET BY MOUTH TWICE DAILY WITH MEALS 3/23/11  Yes Thanh Grider, DO   Insulin Needles, Disposable, (BD INSULIN PEN NEEDLE UF SHORT) 31 X 5/16 \" Ndle UAD - USE AS DIRECTED 10/11/10  Yes Thanh Grider,    aspirin delayed-release 81 mg tablet Take 81 mg by mouth daily. Yes Provider, Historical   liraglutide (Victoza 3-Franklin) 0.6 mg/0.1 mL (18 mg/3 mL) pnij inject 1.8mg    Provider, Historical   topiramate (TOPAMAX) 50 mg tablet TAKE 1 TABLET BY MOUTH TWICE A DAY 20   Sabine Grider,    meclizine (ANTIVERT) 12.5 mg tablet Take 1 Tab by mouth daily as needed.  18   Lolly Lay, DO   diclofenac EC (VOLTAREN) 75 mg EC tablet TAKE 1 (ONE) TABLET BY MOUTH TWO TIMES DAILY, TAKE WITH FOOD 5/18/18   Provider, Historical   SUMAtriptan (IMITREX) 100 mg tablet TAKE 1 TABLET EVERY DAY AS NEEDED MIGRAINE 8/18/17   Thanh Grider DO APIDRA SOLOSTAR 100 unit/mL pen INJECT 11 UNITS PLUS 2 UNITS FOR EVERY 25PTS > 100 WITH MEALS THREE TIMES A DAY SUBCUTANEOUS 30 11/2/16   Provider, Historical   desogestrel-ethinyl estradiol (DESOGEN) 0.15-0.03 mg per tablet Take 1 Tab by mouth daily. Provider, Historical   diphenhydrAMINE (BENADRYL) 25 mg capsule Take 25 mg by mouth every six (6) hours as needed. Provider, Historical   insulin glargine (LANTUS) 100 unit/mL injection 46 Units by SubCUTAneous route once. Provider, Historical     Patient Active Problem List    Diagnosis Date Noted    Vertigo 11/27/2018    Obesity (BMI 30.0-34.9) 11/27/2018    Chronic idiopathic urticaria 11/13/2017    Anxiety 11/13/2017    Postablative hypothyroidism 11/08/2016    H/O Graves' disease 11/08/2016    Morbid obesity with BMI of 40.0-44.9, adult ([facility] [address] 11/08/2016    Migraine 04/09/2012    DM (diabetes mellitus) ([facility] 07/02/2010    HTN (hypertension) 07/02/2010     Current Outpatient Medications   Medication Sig Dispense Refill    Basaglar KwikPen U-100 Insulin 100 unit/mL (3 mL) inpn       ferrous gluconate 324 mg (37.5 mg iron) tablet Take 1 Tab by mouth Daily (before breakfast). 90 Tab 1    cholecalciferol (VITAMIN D3) (1000 Units /25 mcg) tablet Take  by mouth daily.  amLODIPine (NORVASC) 10 mg tablet TAKE 1 TABLET BY MOUTH EVERY DAY  3    famotidine (PEPCID) 40 mg tablet Take 40 mg by mouth daily.  fexofenadine (ALLEGRA) 180 mg tablet Take  by mouth.  BD INSULIN PEN NEEDLE UF MINI 31 gauge x 3/16\" ndle USE AS DIRECTED FOR INSULIN INJECTIONS,FOUR TIMES A DAY SUBCUTANEOUSLY FOR 30 DAYS 30  11    levothyroxine (SYNTHROID) 125 mcg tablet TAKE 1 TABLET EVERY DAY  2    Liraglutide (VICTOZA) 0.6 mg/0.1 mL (18 mg/3 mL) sub-q pen by SubCUTAneous route.  glucose blood VI test strips (ONE TOUCH ULTRA TEST) strip by Does Not Apply route See Admin Instructions.  sitagliptin-metformin (JANUMET) 50-1,000 mg per tablet 1BIDCM - TAKE ONE TABLET BY MOUTH TWICE DAILY WITH MEALS 60 Tab 0    Insulin Needles, Disposable, (BD INSULIN PEN NEEDLE UF SHORT) 31 X 5/16 \" Ndle UAD - USE AS DIRECTED 100 Package 5    aspirin delayed-release 81 mg tablet Take 81 mg by mouth daily.  liraglutide (Victoza 3-Franklin) 0.6 mg/0.1 mL (18 mg/3 mL) pnij inject 1.8mg      topiramate (TOPAMAX) 50 mg tablet TAKE 1 TABLET BY MOUTH TWICE A DAY 60 Tab 0    meclizine (ANTIVERT) 12.5 mg tablet Take 1 Tab by mouth daily as needed. 30 Tab 2    diclofenac EC (VOLTAREN) 75 mg EC tablet TAKE 1 (ONE) TABLET BY MOUTH TWO TIMES DAILY, TAKE WITH FOOD  1    SUMAtriptan (IMITREX) 100 mg tablet TAKE 1 TABLET EVERY DAY AS NEEDED MIGRAINE 9 Tab 5    APIDRA SOLOSTAR 100 unit/mL pen INJECT 11 UNITS PLUS 2 UNITS FOR EVERY 25PTS > 100 WITH MEALS THREE TIMES A DAY SUBCUTANEOUS 30  2    desogestrel-ethinyl estradiol (DESOGEN) 0.15-0.03 mg per tablet Take 1 Tab by mouth daily.  diphenhydrAMINE (BENADRYL) 25 mg capsule Take 25 mg by mouth every six (6) hours as needed.  insulin glargine (LANTUS) 100 unit/mL injection 46 Units by SubCUTAneous route once. Allergies   Allergen Reactions    Bee Sting [Sting, Bee] Swelling    Fish Containing Products Hives    Lamisil  [Terbinafine Hcl] Unknown (comments)    Lamisil [Terbinafine] Rash    Lisinopril Hives     hives and swollen lip.   Other reaction(s): Rash, swelling    Shellfish Derived Hives     Past Medical History:   Diagnosis Date    Benign pancreatic islet cell tumors     2001    Diabetes ([address]     Migraine     Thyroid disease     hyperthyroid     Past Surgical History:   Procedure Laterality Date    ABDOMEN SURGERY PROC UNLISTED      HX BREAST REDUCTION Bilateral 2004    HX SPLENECTOMY      2002     Social History     Tobacco Use    Smoking status: Never Smoker    Smokeless tobacco: Never Used   Substance Use Topics    Alcohol use: No       ROS    Objective:     Patient-Reported Vitals 7/13/2020   Patient-Reported Weight 240lb   Patient-Reported Height 5f6   Patient-Reported Temperature 98.3        [INSTRUCTIONS:  \"[x]\" Indicates a positive item  \"[]\" Indicates a negative item  -- DELETE ALL ITEMS NOT EXAMINED]    Constitutional: [x] Appears well-developed and well-nourished [x] No apparent distress      [] Abnormal -     Mental status: [x] Alert and awake  [x] Oriented to person/place/time [x] Able to follow commands    [] Abnormal -     Eyes:   EOM    [x]  Normal    [] Abnormal -   Sclera  [x]  Normal    [] Abnormal -          Discharge [x]  None visible   [] Abnormal -     HENT: [x] Normocephalic, atraumatic  [] Abnormal -   [x] Mouth/Throat: Mucous membranes are moist    External Ears [x] Normal  [] Abnormal -    Neck: [x] No visualized mass [] Abnormal -     Pulmonary/Chest: [x] Respiratory effort normal   [x] No visualized signs of difficulty breathing or respiratory distress        [] Abnormal -      Musculoskeletal:   [x] Normal gait with no signs of ataxia         [x] Normal range of motion of neck        [] Abnormal -     Neurological:        [x] No Facial Asymmetry (Cranial nerve 7 motor function) (limited exam due to video visit)          [x] No gaze palsy        [] Abnormal -          Skin:        [x] No significant exanthematous lesions or discoloration noted on facial skin         [] Abnormal -            Psychiatric:       [x] Normal Affect [] Abnormal -        [x] No Hallucinations    Other pertinent observable physical exam findings:-        We discussed the expected course, resolution and complications of the diagnosis(es) in detail. Medication risks, benefits, costs, interactions, and alternatives were discussed as indicated.   I advised her to contact the office if her condition worsens, changes or fails to improve as anticipated. She expressed understanding with the diagnosis(es) and plan. Rajwinder Farfan Vincenzo, who was evaluated through a patient-initiated, synchronous (real-time) audio-video encounter, and/or her healthcare decision maker, is aware that it is a billable service, with coverage as determined by her insurance carrier. She provided verbal consent to proceed: Yes, and patient identification was verified. It was conducted pursuant to the emergency declaration under the [address] [place] [address], [address] [place] [address] authority and the [email or web address] General Act. A caregiver was present when appropriate. Ability to conduct physical exam was limited. I was at home. The patient was at home.       Sarah Persons, DO

## 2020-07-13 NOTE — PROGRESS NOTES
ADVISED PATIENT OF THE FOLLOWING HEALTH MAINTAINCE DUE  Health Maintenance Due   Topic Date Due    DTaP/Tdap/Td series (1 - Tdap) 05/26/1991    PAP AKA CERVICAL CYTOLOGY  05/26/1991    Pneumococcal 0-64 years (2 of 3 - PPSV23) 01/03/2017    MICROALBUMIN Q1  08/08/2019    Lipid Screen  08/08/2019    Foot Exam Q1  11/27/2019    Shingrix Vaccine Age 50> (1 of 2) 05/26/2020    FOBT Q1Y Age 54-65  05/26/2020      Chief Complaint   Patient presents with    Hypertension     f/u     Diabetes    Anxiety    Obesity       1. Have you been to the ER, urgent care clinic since your last visit? Hospitalized since your last visit? Out patient surgery for rotator cuff repair    2. Have you seen or consulted any other health care providers outside of the [address] [place] [address] since your last visit? Include any DEXA scan, mammography  or colon screening. No    3. Do you have an Advance Directive on file? no    4. Do you have a DNR on file? no    Patient is accompanied by self I have received verbal consent from Rajw[address] Farfan [address] to discuss any/all medical information while they are present in the room. No flowsheet data found. [facility] [address]  [address] [facility] [place] [address]  Phone: [phone] Fax: [phone]    [id number] [address] [place] [address] [place] [address]  Phone: [phone] Fax: [phone]        Patient reminded during visit to bring all medication bottles, OTC medications to all appointments.

## 2020-07-19 DIAGNOSIS — G43.001 MIGRAINE WITHOUT AURA AND WITH STATUS MIGRAINOSUS, NOT INTRACTABLE: ICD-10-CM

## 2020-07-20 RX ORDER — TOPIRAMATE 50 MG/1
TABLET, FILM COATED ORAL
Qty: 60 TAB | Refills: 0 | Status: SHIPPED | OUTPATIENT
Start: 2020-07-20 | End: 2020-09-28

## 2020-07-21 DIAGNOSIS — D50.9 IRON DEFICIENCY ANEMIA, UNSPECIFIED IRON DEFICIENCY ANEMIA TYPE: ICD-10-CM

## 2020-07-21 RX ORDER — FERROUS GLUCONATE 324(38)MG
TABLET ORAL
Qty: 93 TAB | Refills: 1 | Status: SHIPPED | OUTPATIENT
Start: 2020-07-21 | End: 2021-01-26

## 2020-09-27 DIAGNOSIS — G43.001 MIGRAINE WITHOUT AURA AND WITH STATUS MIGRAINOSUS, NOT INTRACTABLE: ICD-10-CM

## 2020-09-28 RX ORDER — TOPIRAMATE 50 MG/1
TABLET, FILM COATED ORAL
Qty: 60 TAB | Refills: 0 | Status: SHIPPED | OUTPATIENT
Start: 2020-09-28 | End: 2020-10-23 | Stop reason: SDUPTHER

## 2020-10-23 DIAGNOSIS — G43.001 MIGRAINE WITHOUT AURA AND WITH STATUS MIGRAINOSUS, NOT INTRACTABLE: ICD-10-CM

## 2020-10-23 RX ORDER — TOPIRAMATE 50 MG/1
TABLET, FILM COATED ORAL
Qty: 60 TAB | Refills: 0 | Status: SHIPPED | OUTPATIENT
Start: 2020-10-23 | End: 2020-11-30 | Stop reason: SDUPTHER

## 2020-11-29 DIAGNOSIS — G43.001 MIGRAINE WITHOUT AURA AND WITH STATUS MIGRAINOSUS, NOT INTRACTABLE: ICD-10-CM

## 2020-11-30 RX ORDER — TOPIRAMATE 50 MG/1
TABLET, FILM COATED ORAL
Qty: 60 TAB | Refills: 0 | Status: SHIPPED | OUTPATIENT
Start: 2020-11-30 | End: 2020-12-29

## 2020-12-29 DIAGNOSIS — G43.001 MIGRAINE WITHOUT AURA AND WITH STATUS MIGRAINOSUS, NOT INTRACTABLE: ICD-10-CM

## 2020-12-29 RX ORDER — TOPIRAMATE 50 MG/1
TABLET, FILM COATED ORAL
Qty: 60 TAB | Refills: 0 | Status: SHIPPED | OUTPATIENT
Start: 2020-12-29 | End: 2021-01-29

## 2021-01-26 DIAGNOSIS — D50.9 IRON DEFICIENCY ANEMIA, UNSPECIFIED IRON DEFICIENCY ANEMIA TYPE: ICD-10-CM

## 2021-01-26 RX ORDER — FERROUS GLUCONATE 324(38)MG
TABLET ORAL
Qty: 93 TAB | Refills: 1 | Status: SHIPPED | OUTPATIENT
Start: 2021-01-26 | End: 2021-06-21

## 2021-02-16 ENCOUNTER — OFFICE VISIT (OUTPATIENT)
Dept: INTERNAL MEDICINE CLINIC | Age: 51
End: 2021-02-16
Payer: COMMERCIAL

## 2021-02-16 VITALS
RESPIRATION RATE: 16 BRPM | OXYGEN SATURATION: 97 % | HEART RATE: 95 BPM | BODY MASS INDEX: 37.12 KG/M2 | DIASTOLIC BLOOD PRESSURE: 64 MMHG | TEMPERATURE: 98.2 F | SYSTOLIC BLOOD PRESSURE: 132 MMHG | HEIGHT: 66 IN | WEIGHT: 231 LBS

## 2021-02-16 DIAGNOSIS — Z79.4 TYPE 2 DIABETES MELLITUS WITH DIABETIC POLYNEUROPATHY, WITH LONG-TERM CURRENT USE OF INSULIN (HCC): Primary | ICD-10-CM

## 2021-02-16 DIAGNOSIS — E66.9 OBESITY (BMI 30.0-34.9): ICD-10-CM

## 2021-02-16 DIAGNOSIS — M25.511 CHRONIC RIGHT SHOULDER PAIN: ICD-10-CM

## 2021-02-16 DIAGNOSIS — G89.29 CHRONIC RIGHT SHOULDER PAIN: ICD-10-CM

## 2021-02-16 DIAGNOSIS — M25.531 RIGHT WRIST PAIN: ICD-10-CM

## 2021-02-16 DIAGNOSIS — M79.605 PAIN IN BOTH LOWER EXTREMITIES: ICD-10-CM

## 2021-02-16 DIAGNOSIS — M79.604 PAIN IN BOTH LOWER EXTREMITIES: ICD-10-CM

## 2021-02-16 DIAGNOSIS — M79.18 BUTTOCK PAIN: ICD-10-CM

## 2021-02-16 DIAGNOSIS — E11.42 TYPE 2 DIABETES MELLITUS WITH DIABETIC POLYNEUROPATHY, WITH LONG-TERM CURRENT USE OF INSULIN (HCC): Primary | ICD-10-CM

## 2021-02-16 DIAGNOSIS — Z23 NEEDS FLU SHOT: ICD-10-CM

## 2021-02-16 DIAGNOSIS — I10 ESSENTIAL HYPERTENSION: ICD-10-CM

## 2021-02-16 PROBLEM — M25.559 HIP PAIN: Status: ACTIVE | Noted: 2021-02-16

## 2021-02-16 PROCEDURE — 90686 IIV4 VACC NO PRSV 0.5 ML IM: CPT | Performed by: INTERNAL MEDICINE

## 2021-02-16 PROCEDURE — 99214 OFFICE O/P EST MOD 30 MIN: CPT | Performed by: INTERNAL MEDICINE

## 2021-02-16 RX ORDER — EPINEPHRINE 0.3 MG/.3ML
0.3 INJECTION SUBCUTANEOUS
Qty: 2 SYRINGE | Refills: 1 | Status: SHIPPED | OUTPATIENT
Start: 2021-02-16 | End: 2021-02-16

## 2021-02-16 RX ORDER — GABAPENTIN 300 MG/1
300 CAPSULE ORAL
Qty: 30 CAP | Refills: 1 | Status: SHIPPED | OUTPATIENT
Start: 2021-02-16 | End: 2021-05-13

## 2021-02-16 RX ORDER — ALOGLIPTIN AND METFORMIN HYDROCHLORIDE 12.5; 1 MG/1; MG/1
12 TABLET, FILM COATED ORAL 2 TIMES DAILY
COMMUNITY
End: 2022-09-08

## 2021-02-16 NOTE — PROGRESS NOTES
ADVISED PATIENT OF THE FOLLOWING HEALTH MAINTAINCE DUE  Health Maintenance Due   Topic Date Due    COVID-19 Vaccine (1 of 2) 05/26/1986    DTaP/Tdap/Td series (1 - Tdap) 05/26/1991    PAP AKA CERVICAL CYTOLOGY  05/26/1991    Pneumococcal 0-64 years (2 of 3 - PPSV23) 01/03/2017    MICROALBUMIN Q1  08/08/2019    Lipid Screen  08/08/2019    Foot Exam Q1  11/27/2019    Shingrix Vaccine Age 50> (1 of 2) 05/26/2020    Colorectal Cancer Screening Combo  05/26/2020    Flu Vaccine (1) 09/01/2020    A1C test (Diabetic or Prediabetic)  01/06/2021      Chief Complaint   Patient presents with    Hypertension    Diabetes    Pain (Chronic)     general        1. Have you been to the ER, urgent care clinic since your last visit? Hospitalized since your last visit? No    2. Have you seen or consulted any other health care providers outside of the [address] [place] [address] since your last visit? Include any DEXA scan, mammography  or colon screening. No    3. Do you have an Advance Directive on file? no    4. Do you have a DNR on file? no    Patient is accompanied by self I have received verbal consent from Rajw[address] to discuss any/all medical information while they are present in the room. No flowsheet data found. [facility] [address]  [address] [place] [address]  Phone: [phone] Fax: [phone]    [id number] [address] [place] [address] [address]  Phone: [phone] Fax: [phone]        Patient reminded during visit to bring all medication bottles, OTC medications to all appointments.

## 2021-02-16 NOTE — PATIENT INSTRUCTIONS
Vaccine Information Statement    Influenza (Flu) Vaccine (Inactivated or Recombinant): What You Need to Know    Many Vaccine Information Statements are available in Faroese and other languages. See www.immunize.org/vis  Hojas de información sobre vacunas están disponibles en español y en muchos otros idiomas. Visite www.immunize.org/vis    1. Why get vaccinated? Influenza vaccine can prevent influenza (flu). Flu is a contagious disease that spreads around the [facility] every year, usually between October and May. Anyone can get the flu, but it is more dangerous for some people. Infants and young children, people 72years of age and older, pregnant women, and people with certain health conditions or a weakened immune system are at greatest risk of flu complications. Pneumonia, bronchitis, sinus infections and ear infections are examples of flu-related complications. If you have a medical condition, such as heart disease, cancer or diabetes, flu can make it worse. Flu can cause fever and chills, sore throat, muscle aches, fatigue, cough, headache, and runny or stuffy nose. Some people may have vomiting and diarrhea, though this is more common in children than adults. Each year thousands of people in the [facility] die from flu, and many more are hospitalized. Flu vaccine prevents millions of illnesses and flu-related visits to the doctor each year. 2. Influenza vaccines     CDC recommends everyone 10months of age and older get vaccinated every flu season. Children 6 months through 6years of age may need 2 doses during a single flu season. Everyone else needs only 1 dose each flu season. It takes about 2 weeks for protection to develop after vaccination. There are many flu viruses, and they are always changing. Each year a new flu vaccine is made to protect against three or four viruses that are likely to cause disease in the upcoming flu season.  Even when the vaccine doesnt exactly match these viruses, it may still provide some protection. Influenza vaccine does not cause flu. Influenza vaccine may be given at the same time as other vaccines. 3. Talk with your health care provider    Tell your vaccine provider if the person getting the vaccine:   Has had an allergic reaction after a previous dose of influenza vaccine, or has any severe, life-threatening allergies.  Has ever had Guillain-Barré Syndrome (also called GBS). In some cases, your health care provider may decide to postpone influenza vaccination to a future visit. People with minor illnesses, such as a cold, may be vaccinated. People who are moderately or severely ill should usually wait until they recover before getting influenza vaccine. Your health care provider can give you more information. 4. Risks of a reaction     Soreness, redness, and swelling where shot is given, fever, muscle aches, and headache can happen after influenza vaccine.  There may be a very small increased risk of Guillain-Barré Syndrome (GBS) after inactivated influenza vaccine (the flu shot). Janeal Ray children who get the flu shot along with pneumococcal vaccine (PCV13), and/or DTaP vaccine at the same time might be slightly more likely to have a seizure caused by fever. Tell your health care provider if a child who is getting flu vaccine has ever had a seizure. People sometimes faint after medical procedures, including vaccination. Tell your provider if you feel dizzy or have vision changes or ringing in the ears. As with any medicine, there is a very remote chance of a vaccine causing a severe allergic reaction, other serious injury, or death. 5. What if there is a serious problem? An allergic reaction could occur after the vaccinated person leaves the clinic.  If you see signs of a severe allergic reaction (hives, swelling of the face and throat, difficulty breathing, a fast heartbeat, dizziness, or weakness), call 9-1-1 and get the person to the nearest hospital.    For other signs that concern you, call your health care provider. Adverse reactions should be reported to the Vaccine Adverse Event Reporting System (VAERS). Your health care provider will usually file this report, or you can do it yourself. Visit the VAERS website at www.vaers. [facility].gov or call 9-218.412.1027. VAERS is only for reporting reactions, and VAERS staff do not give medical advice. 6. The National Vaccine Injury Compensation Program    The [facility] Vaccine Injury Compensation Program (VICP) is a federal program that was created to compensate people who may have been injured by certain vaccines. Visit the VICP website at www.[facility].gov/vaccinecompensation or call 7-250.966.2005 to learn about the program and about filing a claim. There is a time limit to file a claim for compensation. 7. How can I learn more?  Ask your health care provider.  Call your local or state health department.  Contact the Centers for Disease Control and Prevention (CDC):  - Call 5-236.832.8259 (1-800-CDC-INFO) or  - Visit CDCs influenza website at www.cdc.gov/flu    Vaccine Information Statement (Interim)  Inactivated Influenza Vaccine   8/15/2019  [address] PATTIE Nunn [id number]   Department of Health and Human Services  Centers for Disease Control and Prevention    Office Use Only

## 2021-02-16 NOTE — PROGRESS NOTES
HISTORY OF PRESENT ILLNESS  Darnell Meckel is a 48 y.o. female. Pt. comes in for f/u. Has multiple medical problems. Vital signs are stable. Reports aching all over. Mostly in buttocks and legs. Worse with prolonged standing. Also has right wrist and hand pain with numbness in the thumb. Denies any obvious trauma. Continues have issues with right shoulder pain and decreased ROM. Has had previous rotator cuff surgery. Followed by endocrinologist for DM. Reports neuropathy symptoms in feet. No vision change or skin issues. She is on glargine, Humalog and Kazano. Diabetes has been stable. Had labs 2 weeks ago but does not know the results yet. A1c in 1/2020 was 8.0. Has chronic headaches and vertigo. Takes medications as needed. Followed by allergist for idiopathic urticaria. She is obese with BMI 37. Reports compliance with medications and diet. Med list and most recent labs/studies reviewed with pt. Trying to be active physically to control weight. Needs med refills. Denies any signs or symptoms of COVID-19. Reports no other new c/o. HPI    Review of Systems   Constitutional: Negative. HENT: Negative. Eyes: Negative for blurred vision. Respiratory: Negative for shortness of breath. Cardiovascular: Negative for chest pain and leg swelling. Gastrointestinal: Negative for abdominal pain. Genitourinary: Negative for dysuria. Musculoskeletal: Positive for back pain, joint pain and myalgias. Negative for falls. Skin: Negative. Neurological: Positive for sensory change and headaches. Negative for dizziness and focal weakness. Endo/Heme/Allergies: Negative for polydipsia. Psychiatric/Behavioral: Negative. All other systems reviewed and are negative. Physical Exam  Vitals signs and nursing note reviewed. Constitutional:       General: She is not in acute distress. Appearance: She is well-developed.       Comments: Obese  pleasant   HENT:      Head: Normocephalic and atraumatic. Mouth/Throat:      Mouth: Mucous membranes are moist.   Eyes:      General: No scleral icterus. Conjunctiva/sclera: Conjunctivae normal.   Neck:      Musculoskeletal: Normal range of motion and neck supple. Thyroid: No thyromegaly. Vascular: No JVD. Cardiovascular:      Rate and Rhythm: Normal rate and regular rhythm. Heart sounds: Normal heart sounds. No murmur. Pulmonary:      Effort: Pulmonary effort is normal. No respiratory distress. Breath sounds: Normal breath sounds. No wheezing or rales. Abdominal:      General: Bowel sounds are normal. There is no distension. Palpations: Abdomen is soft. Tenderness: There is no abdominal tenderness. Comments: obese   Musculoskeletal:         General: Tenderness (Lower lumbar/buttocks/right wrist/right shoulder with decreased ROM) present. Right lower leg: No edema. Left lower leg: No edema. Skin:     General: Skin is warm and dry. Findings: No rash. Neurological:      Mental Status: She is alert and oriented to person, place, and time. Cranial Nerves: No cranial nerve deficit. Coordination: Coordination normal.   Psychiatric:         Behavior: Behavior normal.         ASSESSMENT and PLAN  Diagnoses and all orders for this visit:    1. Type 2 diabetes mellitus with diabetic polyneuropathy, with long-term current use of insulin (HCC)  -     gabapentin (NEURONTIN) 300 mg capsule; Take 1 Cap by mouth three (3) times daily as needed for Pain. Max Daily Amount: 900 mg.    2. Pain in both lower extremities  -     gabapentin (NEURONTIN) 300 mg capsule; Take 1 Cap by mouth three (3) times daily as needed for Pain. Max Daily Amount: 900 mg.  -     REFERRAL TO PHYSICAL THERAPY    3. Right wrist pain  -     gabapentin (NEURONTIN) 300 mg capsule; Take 1 Cap by mouth three (3) times daily as needed for Pain. Max Daily Amount: 900 mg.  -     REFERRAL TO PHYSICAL THERAPY    4. Chronic right shoulder pain  -     REFERRAL TO PHYSICAL THERAPY    5. Needs flu shot  -     INFLUENZA VIRUS VAC QUAD,SPLIT,PRESV FREE SYRINGE IM    6. Buttock pain    7. Essential hypertension    8. Obesity (BMI 30.0-34.9)    Other orders  -     EPINEPHrine (EPIPEN) 0.3 mg/0.3 mL injection; 0.3 mL by IntraMUSCular route once as needed for Allergic Response for up to 1 dose. Follow-up and Dispositions    · Return in about 6 weeks (around 3/30/2021).      lab results and schedule of future lab studies reviewed with patient  reviewed diet, exercise and weight control  reviewed medications and side effects in detail  Monitor BS at home with goal of 100-150  Monitor BP at home with goal of 140/90 or less  F/u with other MD's as scheduled  Asked patient to have recent labs sent to me

## 2021-03-03 DIAGNOSIS — G43.001 MIGRAINE WITHOUT AURA AND WITH STATUS MIGRAINOSUS, NOT INTRACTABLE: ICD-10-CM

## 2021-03-03 RX ORDER — TOPIRAMATE 50 MG/1
TABLET, FILM COATED ORAL
Qty: 60 TAB | Refills: 0 | Status: SHIPPED | OUTPATIENT
Start: 2021-03-03 | End: 2021-04-01

## 2021-04-01 DIAGNOSIS — G43.001 MIGRAINE WITHOUT AURA AND WITH STATUS MIGRAINOSUS, NOT INTRACTABLE: ICD-10-CM

## 2021-04-01 RX ORDER — TOPIRAMATE 50 MG/1
TABLET, FILM COATED ORAL
Qty: 60 TAB | Refills: 0 | Status: SHIPPED | OUTPATIENT
Start: 2021-04-01 | End: 2021-04-23

## 2021-04-23 DIAGNOSIS — G43.001 MIGRAINE WITHOUT AURA AND WITH STATUS MIGRAINOSUS, NOT INTRACTABLE: ICD-10-CM

## 2021-04-23 RX ORDER — TOPIRAMATE 50 MG/1
TABLET, FILM COATED ORAL
Qty: 60 TAB | Refills: 0 | Status: SHIPPED | OUTPATIENT
Start: 2021-04-23 | End: 2021-05-11 | Stop reason: SDUPTHER

## 2021-05-11 DIAGNOSIS — G43.001 MIGRAINE WITHOUT AURA AND WITH STATUS MIGRAINOSUS, NOT INTRACTABLE: ICD-10-CM

## 2021-05-11 DIAGNOSIS — E11.42 TYPE 2 DIABETES MELLITUS WITH DIABETIC POLYNEUROPATHY, WITH LONG-TERM CURRENT USE OF INSULIN (HCC): ICD-10-CM

## 2021-05-11 DIAGNOSIS — Z79.4 TYPE 2 DIABETES MELLITUS WITH DIABETIC POLYNEUROPATHY, WITH LONG-TERM CURRENT USE OF INSULIN (HCC): ICD-10-CM

## 2021-05-11 DIAGNOSIS — M25.531 RIGHT WRIST PAIN: ICD-10-CM

## 2021-05-11 DIAGNOSIS — M79.605 PAIN IN BOTH LOWER EXTREMITIES: ICD-10-CM

## 2021-05-11 DIAGNOSIS — M79.604 PAIN IN BOTH LOWER EXTREMITIES: ICD-10-CM

## 2021-05-11 RX ORDER — TOPIRAMATE 50 MG/1
TABLET, FILM COATED ORAL
Qty: 60 TAB | Refills: 0 | Status: SHIPPED | OUTPATIENT
Start: 2021-05-11 | End: 2021-07-22

## 2021-05-11 NOTE — TELEPHONE ENCOUNTER
[facility]   Requested Prescriptions     Pending Prescriptions Disp Refills    topiramate (TOPAMAX) 50 mg tablet 60 Tab 0   02/16/2021    [facility] upcoming  [facility]

## 2021-05-13 RX ORDER — GABAPENTIN 300 MG/1
300 CAPSULE ORAL
Qty: 30 CAP | Refills: 1 | Status: SHIPPED | OUTPATIENT
Start: 2021-05-13 | End: 2021-06-04 | Stop reason: SDUPTHER

## 2021-05-14 ENCOUNTER — TELEPHONE (OUTPATIENT)
Dept: INTERNAL MEDICINE CLINIC | Age: 51
End: 2021-05-14

## 2021-05-17 ENCOUNTER — TELEPHONE (OUTPATIENT)
Dept: INTERNAL MEDICINE CLINIC | Age: 51
End: 2021-05-17

## 2021-05-17 NOTE — TELEPHONE ENCOUNTER
Call placed to pt and left VM, When pt calls back she needs to be scheduled for f/u with Theresa Zheng

## 2021-05-19 ENCOUNTER — TRANSCRIBE ORDER (OUTPATIENT)
Dept: SCHEDULING | Age: 51
End: 2021-05-19

## 2021-05-19 DIAGNOSIS — Z12.31 VISIT FOR SCREENING MAMMOGRAM: Primary | ICD-10-CM

## 2021-06-04 ENCOUNTER — VIRTUAL VISIT (OUTPATIENT)
Dept: INTERNAL MEDICINE CLINIC | Age: 51
End: 2021-06-04
Payer: COMMERCIAL

## 2021-06-04 DIAGNOSIS — E11.42 TYPE 2 DIABETES MELLITUS WITH DIABETIC POLYNEUROPATHY, WITH LONG-TERM CURRENT USE OF INSULIN (HCC): Primary | ICD-10-CM

## 2021-06-04 DIAGNOSIS — M25.531 RIGHT WRIST PAIN: ICD-10-CM

## 2021-06-04 DIAGNOSIS — M79.605 PAIN IN BOTH LOWER EXTREMITIES: ICD-10-CM

## 2021-06-04 DIAGNOSIS — Z79.4 TYPE 2 DIABETES MELLITUS WITH DIABETIC POLYNEUROPATHY, WITH LONG-TERM CURRENT USE OF INSULIN (HCC): Primary | ICD-10-CM

## 2021-06-04 DIAGNOSIS — E89.0 POSTABLATIVE HYPOTHYROIDISM: ICD-10-CM

## 2021-06-04 DIAGNOSIS — G43.009 MIGRAINE WITHOUT AURA AND WITHOUT STATUS MIGRAINOSUS, NOT INTRACTABLE: ICD-10-CM

## 2021-06-04 DIAGNOSIS — M79.604 PAIN IN BOTH LOWER EXTREMITIES: ICD-10-CM

## 2021-06-04 DIAGNOSIS — I10 ESSENTIAL HYPERTENSION: ICD-10-CM

## 2021-06-04 DIAGNOSIS — E66.9 OBESITY (BMI 30.0-34.9): ICD-10-CM

## 2021-06-04 PROCEDURE — 99214 OFFICE O/P EST MOD 30 MIN: CPT | Performed by: INTERNAL MEDICINE

## 2021-06-04 RX ORDER — GABAPENTIN 300 MG/1
300 CAPSULE ORAL 2 TIMES DAILY
Qty: 60 CAPSULE | Refills: 5 | Status: SHIPPED | OUTPATIENT
Start: 2021-06-04 | End: 2021-08-02

## 2021-06-04 RX ORDER — SUMATRIPTAN 100 MG/1
TABLET, FILM COATED ORAL
Qty: 9 TABLET | Refills: 5 | Status: SHIPPED | OUTPATIENT
Start: 2021-06-04 | End: 2022-09-08

## 2021-06-04 NOTE — PROGRESS NOTES
Health Maintenance Due   Topic Date Due    Hepatitis C Screening  Never done    MenB Meningococcal topic (1 of 4 - Increased Risk Bexsero 2-dose series) Never done    COVID-19 Vaccine (1) Never done    DTaP/Tdap/Td series (1 - Tdap) Never done    PAP AKA CERVICAL CYTOLOGY  Never done    Pneumococcal 0-64 years (2 of 4 - PPSV23) 01/03/2017    MICROALBUMIN Q1  08/08/2019    Lipid Screen  08/08/2019    Shingrix Vaccine Age 50> (1 of 2) Never done    Colorectal Cancer Screening Combo  Never done    A1C test (Diabetic or Prediabetic)  01/06/2021       No chief complaint on file. 1. Have you been to the ER, urgent care clinic since your last visit? Hospitalized since your last visit? No    2. Have you seen or consulted any other health care providers outside of the [address] [place] [address] since your last visit? Include any pap smears or colon screening. No    3) Do you have an Advance Directive on file? no    4) Are you interested in receiving information on Advance Directives? NO      Patient is accompanied by self I have received verbal consent from [address] to discuss any/all medical information while they are present in the room.

## 2021-06-04 NOTE — PROGRESS NOTES
Charo Rosenberg (: 1970) is a 46 y.o. female, established patient, here for evaluation of the following chief complaint(s):   Hypertension and Migraine       ASSESSMENT/PLAN:  Below is the assessment and plan developed based on review of pertinent labs, studies, and medications. 1. Type 2 diabetes mellitus with diabetic polyneuropathy, with long-term current use of insulin (HCC)  -     gabapentin (NEURONTIN) 300 mg capsule; Take 1 Capsule by mouth two (2) times a day. Max Daily Amount: 600 mg., Normal, Disp-60 Capsule, R-5Not to exceed 4 additional fills before 08/15/2021 DX Code Needed  . 2. Pain in both lower extremities  -     gabapentin (NEURONTIN) 300 mg capsule; Take 1 Capsule by mouth two (2) times a day. Max Daily Amount: 600 mg., Normal, Disp-60 Capsule, R-5Not to exceed 4 additional fills before 08/15/2021 DX Code Needed  . 3. Right wrist pain  -     gabapentin (NEURONTIN) 300 mg capsule; Take 1 Capsule by mouth two (2) times a day. Max Daily Amount: 600 mg., Normal, Disp-60 Capsule, R-5Not to exceed 4 additional fills before 08/15/2021 DX Code Needed  . 4. Essential hypertension  5. Migraine without aura and without status migrainosus, not intractable  6. Postablative hypothyroidism  7. Obesity (BMI 30.0-34. 9)      Return in about 6 months (around 2021). SUBJECTIVE/OBJECTIVE:  Pt. is seen virtually for f/u. Has a few chronic medical issues as documented. Reports a stable vitals at home. Diabetes has been stable. Has diabetic neuropathy. Gabapentin helps. Needs a refill. Continues to have migraine type headaches. Imitrex helps. Needs a refill. She is obese with BMI over 35. Taking precautions for Covid 19. Denies any related signs or symptoms including fever, cough, SOB or CP. PMH/PSH/Allergies/Social History/medication list and most recent studies reviewed with patient. Tobacco use: No  Alcohol use: Social    Reports compliance with medications and diet.  Trying to be active physically to control weight. Reports no other new c/o.     Plan:  Refill requested medications  Continue current medications  Watch diet and remain active physically  Follow-up with other MDs/specialists as scheduled  COVID-19 precautions discussed with pt  Follow-up 6 months or as needed      Physical Exam    [INSTRUCTIONS:  \"[x]\" Indicates a positive item  \"[]\" Indicates a negative item  -- DELETE ALL ITEMS NOT EXAMINED]    Constitutional: [x] Appears well-developed and well-nourished [x] No apparent distress      [] Abnormal -     Mental status: [x] Alert and awake  [x] Oriented to person/place/time [x] Able to follow commands    [] Abnormal -     Eyes:   EOM    [x]  Normal    [] Abnormal -   Sclera  [x]  Normal    [] Abnormal -          Discharge [x]  None visible   [] Abnormal -     HENT: [x] Normocephalic, atraumatic  [] Abnormal -   [x] Mouth/Throat: Mucous membranes are moist    External Ears [x] Normal  [] Abnormal -    Neck: [x] No visualized mass [] Abnormal -     Pulmonary/Chest: [x] Respiratory effort normal   [x] No visualized signs of difficulty breathing or respiratory distress        [] Abnormal -      Musculoskeletal:   [x] Normal gait with no signs of ataxia         [x] Normal range of motion of neck        [] Abnormal -     Neurological:        [x] No Facial Asymmetry (Cranial nerve 7 motor function) (limited exam due to video visit)          [x] No gaze palsy        [] Abnormal -          Skin:        [x] No significant exanthematous lesions or discoloration noted on facial skin         [] Abnormal -            Psychiatric:       [x] Normal Affect [] Abnormal -        [x] No Hallucinations    Other pertinent observable physical exam findings:-        On this date 06/04/2021 I have spent 25 minutes reviewing previous notes, test results and face to face (virtual) with the patient discussing the diagnosis and importance of compliance with the treatment plan as well as documenting on the day of the visit. [facility], was evaluated through a synchronous (real-time) audio-video encounter. The patient (or guardian if applicable) is aware that this is a billable service. Verbal consent to proceed has been obtained within the past 12 months. The visit was conducted pursuant to the emergency declaration under the [address] [place] [address], [address] [place] [address] authority and the [employer] and [employer] Act. Patient identification was verified, and a caregiver was present when appropriate. The patient was located in a state where the provider was credentialed to provide care. An electronic signature was used to authenticate this note.   -- Milad Petit, DO

## 2021-06-14 LAB — HBA1C MFR BLD HPLC: 7.7 %

## 2021-06-20 DIAGNOSIS — D50.9 IRON DEFICIENCY ANEMIA, UNSPECIFIED IRON DEFICIENCY ANEMIA TYPE: ICD-10-CM

## 2021-06-21 RX ORDER — FERROUS GLUCONATE 324(37.5)
TABLET ORAL
Qty: 90 TABLET | Refills: 2 | Status: SHIPPED | OUTPATIENT
Start: 2021-06-21 | End: 2022-04-27

## 2021-07-22 ENCOUNTER — HOSPITAL ENCOUNTER (OUTPATIENT)
Dept: MAMMOGRAPHY | Age: 51
Discharge: HOME | End: 2021-07-22
Attending: INTERNAL MEDICINE
Payer: COMMERCIAL

## 2021-07-22 DIAGNOSIS — G43.001 MIGRAINE WITHOUT AURA AND WITH STATUS MIGRAINOSUS, NOT INTRACTABLE: ICD-10-CM

## 2021-07-22 DIAGNOSIS — Z12.31 VISIT FOR SCREENING MAMMOGRAM: ICD-10-CM

## 2021-07-22 PROCEDURE — 77063 BREAST TOMOSYNTHESIS BI: CPT

## 2021-07-22 RX ORDER — TOPIRAMATE 50 MG/1
TABLET, FILM COATED ORAL
Qty: 60 TABLET | Refills: 0 | Status: SHIPPED | OUTPATIENT
Start: 2021-07-22 | End: 2021-08-30

## 2021-08-01 DIAGNOSIS — Z79.4 TYPE 2 DIABETES MELLITUS WITH DIABETIC POLYNEUROPATHY, WITH LONG-TERM CURRENT USE OF INSULIN (HCC): ICD-10-CM

## 2021-08-01 DIAGNOSIS — M79.605 PAIN IN BOTH LOWER EXTREMITIES: ICD-10-CM

## 2021-08-01 DIAGNOSIS — E11.42 TYPE 2 DIABETES MELLITUS WITH DIABETIC POLYNEUROPATHY, WITH LONG-TERM CURRENT USE OF INSULIN (HCC): ICD-10-CM

## 2021-08-01 DIAGNOSIS — M79.604 PAIN IN BOTH LOWER EXTREMITIES: ICD-10-CM

## 2021-08-01 DIAGNOSIS — M25.531 RIGHT WRIST PAIN: ICD-10-CM

## 2021-08-02 RX ORDER — GABAPENTIN 300 MG/1
CAPSULE ORAL
Qty: 30 CAPSULE | Refills: 1 | Status: SHIPPED | OUTPATIENT
Start: 2021-08-02 | End: 2021-11-23

## 2021-08-18 ENCOUNTER — TELEPHONE (OUTPATIENT)
Dept: INTERNAL MEDICINE CLINIC | Age: 51
End: 2021-08-18

## 2021-08-18 NOTE — TELEPHONE ENCOUNTER
lvm for pt to r/c  Wanted to verify if a legit letter needs to be written up or should I just fax over her FMLA  Due to her to taking care of her mother (James Martin). Fax # for letter written is [phone] Attn Esmer Cleveland    Letter faxed over per pt.

## 2021-08-18 NOTE — TELEPHONE ENCOUNTER
Pt is on fmla due to taking care of her mother (Kong Cox).  Fax # for letter written is [phone] Attn Robbie Adkins

## 2021-08-19 ENCOUNTER — DOCUMENTATION ONLY (OUTPATIENT)
Dept: INTERNAL MEDICINE CLINIC | Age: 51
End: 2021-08-19

## 2021-08-30 DIAGNOSIS — G43.001 MIGRAINE WITHOUT AURA AND WITH STATUS MIGRAINOSUS, NOT INTRACTABLE: ICD-10-CM

## 2021-08-30 RX ORDER — TOPIRAMATE 50 MG/1
TABLET, FILM COATED ORAL
Qty: 60 TABLET | Refills: 0 | Status: SHIPPED | OUTPATIENT
Start: 2021-08-30 | End: 2021-10-04

## 2021-10-03 DIAGNOSIS — G43.001 MIGRAINE WITHOUT AURA AND WITH STATUS MIGRAINOSUS, NOT INTRACTABLE: ICD-10-CM

## 2021-10-04 RX ORDER — TOPIRAMATE 50 MG/1
TABLET, FILM COATED ORAL
Qty: 60 TABLET | Refills: 0 | Status: SHIPPED | OUTPATIENT
Start: 2021-10-04 | End: 2021-11-02

## 2021-11-02 DIAGNOSIS — G43.001 MIGRAINE WITHOUT AURA AND WITH STATUS MIGRAINOSUS, NOT INTRACTABLE: ICD-10-CM

## 2021-11-02 RX ORDER — TOPIRAMATE 50 MG/1
TABLET, FILM COATED ORAL
Qty: 60 TABLET | Refills: 0 | Status: SHIPPED | OUTPATIENT
Start: 2021-11-02 | End: 2021-11-17 | Stop reason: SDUPTHER

## 2021-11-17 DIAGNOSIS — G43.001 MIGRAINE WITHOUT AURA AND WITH STATUS MIGRAINOSUS, NOT INTRACTABLE: ICD-10-CM

## 2021-11-17 RX ORDER — TOPIRAMATE 50 MG/1
50 TABLET, FILM COATED ORAL 2 TIMES DAILY
Qty: 180 TABLET | Refills: 0 | Status: SHIPPED | OUTPATIENT
Start: 2021-11-17 | End: 2021-12-27

## 2021-11-17 NOTE — TELEPHONE ENCOUNTER
90 day request    Last OV: 06/20/2021  No upcoming     Pharmacy notified via fax that pt will be needing a follow up visit soon.

## 2021-11-23 ENCOUNTER — TELEPHONE (OUTPATIENT)
Dept: INTERNAL MEDICINE CLINIC | Age: 51
End: 2021-11-23

## 2021-12-26 DIAGNOSIS — M79.605 PAIN IN BOTH LOWER EXTREMITIES: ICD-10-CM

## 2021-12-26 DIAGNOSIS — M79.604 PAIN IN BOTH LOWER EXTREMITIES: ICD-10-CM

## 2021-12-26 DIAGNOSIS — E11.42 TYPE 2 DIABETES MELLITUS WITH DIABETIC POLYNEUROPATHY, WITH LONG-TERM CURRENT USE OF INSULIN (HCC): ICD-10-CM

## 2021-12-26 DIAGNOSIS — M25.531 RIGHT WRIST PAIN: ICD-10-CM

## 2021-12-26 DIAGNOSIS — G43.001 MIGRAINE WITHOUT AURA AND WITH STATUS MIGRAINOSUS, NOT INTRACTABLE: ICD-10-CM

## 2021-12-26 DIAGNOSIS — Z79.4 TYPE 2 DIABETES MELLITUS WITH DIABETIC POLYNEUROPATHY, WITH LONG-TERM CURRENT USE OF INSULIN (HCC): ICD-10-CM

## 2021-12-27 RX ORDER — TOPIRAMATE 50 MG/1
TABLET, FILM COATED ORAL
Qty: 180 TABLET | Refills: 0 | Status: SHIPPED | OUTPATIENT
Start: 2021-12-27 | End: 2022-04-27

## 2021-12-27 RX ORDER — GABAPENTIN 300 MG/1
CAPSULE ORAL
Qty: 30 CAPSULE | Refills: 0 | Status: SHIPPED | OUTPATIENT
Start: 2021-12-27 | End: 2022-03-28 | Stop reason: SDUPTHER

## 2022-01-22 ENCOUNTER — OFFICE VISIT (OUTPATIENT)
Dept: URGENT CARE | Age: 52
End: 2022-01-22
Payer: COMMERCIAL

## 2022-01-22 VITALS — OXYGEN SATURATION: 97 % | RESPIRATION RATE: 16 BRPM | HEART RATE: 100 BPM | TEMPERATURE: 98.5 F

## 2022-01-22 DIAGNOSIS — Z20.822 SUSPECTED COVID-19 VIRUS INFECTION: ICD-10-CM

## 2022-01-22 DIAGNOSIS — Z20.822 EXPOSURE TO COVID-19 VIRUS: Primary | ICD-10-CM

## 2022-01-22 PROCEDURE — 99212 OFFICE O/P EST SF 10 MIN: CPT | Performed by: FAMILY MEDICINE

## 2022-01-24 LAB
SARS-COV-2, NAA 2 DAY TAT: NORMAL
SARS-COV-2, NAA: NOT DETECTED

## 2022-02-24 LAB — HBA1C MFR BLD HPLC: 7.9 %

## 2022-03-18 PROBLEM — F41.9 ANXIETY: Status: ACTIVE | Noted: 2017-11-13

## 2022-03-19 PROBLEM — L50.1 CHRONIC IDIOPATHIC URTICARIA: Status: ACTIVE | Noted: 2017-11-13

## 2022-03-19 PROBLEM — M25.559 HIP PAIN: Status: ACTIVE | Noted: 2021-02-16

## 2022-03-19 PROBLEM — E66.9 OBESITY (BMI 30.0-34.9): Status: ACTIVE | Noted: 2018-11-27

## 2022-03-19 PROBLEM — M25.511 CHRONIC RIGHT SHOULDER PAIN: Status: ACTIVE | Noted: 2021-02-16

## 2022-03-19 PROBLEM — G89.29 CHRONIC RIGHT SHOULDER PAIN: Status: ACTIVE | Noted: 2021-02-16

## 2022-03-19 PROBLEM — E66.811 OBESITY (BMI 30.0-34.9): Status: ACTIVE | Noted: 2018-11-27

## 2022-03-19 PROBLEM — R42 VERTIGO: Status: ACTIVE | Noted: 2018-11-27

## 2022-03-22 ENCOUNTER — VIRTUAL VISIT (OUTPATIENT)
Dept: INTERNAL MEDICINE CLINIC | Age: 52
End: 2022-03-22
Payer: COMMERCIAL

## 2022-03-22 DIAGNOSIS — E11.42 TYPE 2 DIABETES MELLITUS WITH DIABETIC POLYNEUROPATHY, WITH LONG-TERM CURRENT USE OF INSULIN (HCC): ICD-10-CM

## 2022-03-22 DIAGNOSIS — I10 ESSENTIAL HYPERTENSION: ICD-10-CM

## 2022-03-22 DIAGNOSIS — E89.0 POSTABLATIVE HYPOTHYROIDISM: ICD-10-CM

## 2022-03-22 DIAGNOSIS — D50.9 IRON DEFICIENCY ANEMIA, UNSPECIFIED IRON DEFICIENCY ANEMIA TYPE: ICD-10-CM

## 2022-03-22 DIAGNOSIS — R42 DIZZINESS: Primary | ICD-10-CM

## 2022-03-22 DIAGNOSIS — Z79.4 TYPE 2 DIABETES MELLITUS WITH DIABETIC POLYNEUROPATHY, WITH LONG-TERM CURRENT USE OF INSULIN (HCC): ICD-10-CM

## 2022-03-22 PROCEDURE — 99442 PR PHYS/QHP TELEPHONE EVALUATION 11-20 MIN: CPT | Performed by: NURSE PRACTITIONER

## 2022-03-22 RX ORDER — MECLIZINE HYDROCHLORIDE 25 MG/1
25 TABLET ORAL
Qty: 30 TABLET | Refills: 0 | Status: SHIPPED | OUTPATIENT
Start: 2022-03-22 | End: 2022-04-01

## 2022-03-22 NOTE — PROGRESS NOTES
Puja Ness is a 46 y.o. female, evaluated via audio-only technology on 3/22/2022 for Dizziness  . Assessment & Plan:   Diagnoses and all orders for this visit:    1. Dizziness  Will order  -     CBC WITH AUTOMATED DIFF  -     METABOLIC PANEL, COMPREHENSIVE  -     TSH 3RD GENERATION  -     T4, FREE  Will refill:  -     meclizine (ANTIVERT) 25 mg tablet; Take 1 Tablet by mouth three (3) times daily as needed for Dizziness for up to 10 days. 2. Type 2 diabetes mellitus with diabetic polyneuropathy, with long-term current use of insulin ([facility] [address]  Recommend regular meals/ snacks to prevent hypoglycemia. Follow-up with endocrinology, as scheduled. 3. Iron deficiency anemia, unspecified iron deficiency anemia type  CBC, as above. 4. Essential hypertension  Stable, 120/70, per patient    5. Postablative hypothyroidism  TSH, Free T4 as above. Patient encouraged to call or return to office if symptoms do not improve or worsen. If experiencing severe head pain and/or neurological changes such as slurred speech, vision change, extremity weakness, or other emergency present to ER. Reviewed medications and side effects in detail. Reviewed plan of care with patient who acknowledges understanding and agrees. Recommend in-office follow-up visit for physical exam in 1-2 weeks, or sooner as needed, with PCP. Patient requests to call back for appointment scheduling, so she can speak with her employer for time off for appointment. 12  Subjective: This patient of Ele Funes NP is seen today with complaints of dizziness. The patient states she has had spells of dizziness / light-headedness over the last 2-3 weeks. She describes increased dizziness with certain movements, particularly with bending over. Her blood pressures have been stable, 120/70, per her report. Patient does note intermittent hypoglycemia.   She is followed by endocrinology and states they are aware and have made adjustments and appointment is upcoming. Patient does have history of vertigo, treated with meclizine several years ago. She presented to an urgent care recently related to the dizziness and was diagnosed again with vertigo. She was prescribed small supply of meclizine, which has helped some. She is interested in refill. Prior to Admission medications    Medication Sig Start Date End Date Taking? Authorizing Provider   topiramate (TOPAMAX) 50 mg tablet TAKE 1 TABLET BY MOUTH TWO TIMES A DAY. 12/27/21   Alana Sheets NP   gabapentin (NEURONTIN) 300 mg capsule TAKE 1 CAP BY MOUTH THREE (3) TIMES DAILY AS NEEDED FOR PAIN. MAX DAILY AMOUNT: 900 MG. 12/27/21   Preethi Gamboa NP   ferrous gluconate 324 mg (37.5 mg iron) tablet TAKE 1 TABLET BY MOUTH EVERY DAY BEFORE BREAKFAST 6/21/21   Alana Sheets NP   SUMAtriptan (IMITREX) 100 mg tablet TAKE 1 TABLET EVERY DAY AS NEEDED MIGRAINE 6/4/21   Blease Carrier, DO   alogliptin-metFORMIN (Kazano) 12.5-1,000 mg per tablet Take 12 mg by mouth two (2) times a day. Provider, Historical   liraglutide (Victoza 3-Franklin) 0.6 mg/0.1 mL (18 mg/3 mL) pnij inject 1.8mg    Provider, Historical   Basaglar KwikPen U-100 Insulin 100 unit/mL (3 mL) inpn  5/27/20   Provider, Historical   cholecalciferol (VITAMIN D3) (1000 Units /25 mcg) tablet Take  by mouth daily. Provider, Historical   amLODIPine (NORVASC) 10 mg tablet TAKE 1 TABLET BY MOUTH EVERY DAY 5/12/18   Provider, Historical   diclofenac EC (VOLTAREN) 75 mg EC tablet TAKE 1 (ONE) TABLET BY MOUTH TWO TIMES DAILY, TAKE WITH FOOD 5/18/18   Provider, Historical   famotidine (PEPCID) 40 mg tablet Take 40 mg by mouth daily. Provider, Historical   fexofenadine (ALLEGRA) 180 mg tablet Take  by mouth.     Provider, Historical   BD INSULIN PEN NEEDLE UF MINI 31 gauge x 3/16\" ndle USE AS DIRECTED FOR INSULIN INJECTIONS,FOUR TIMES A DAY SUBCUTANEOUSLY FOR 30 DAYS 30 3/25/17   Provider, Historical   APIDRA SOLOSTAR 100 unit/mL pen INJECT 11 UNITS PLUS 2 UNITS FOR EVERY 25PTS > 100 WITH MEALS THREE TIMES A DAY SUBCUTANEOUS 30 11/2/16   Provider, Historical   levothyroxine (SYNTHROID) 125 mcg tablet TAKE 1 TABLET EVERY DAY 10/20/16   Provider, Historical   desogestrel-ethinyl estradiol (DESOGEN) 0.15-0.03 mg per tablet Take 1 Tab by mouth daily. Provider, Historical   diphenhydrAMINE (BENADRYL) 25 mg capsule Take 25 mg by mouth every six (6) hours as needed. Provider, Historical   glucose blood VI test strips (ONE TOUCH ULTRA TEST) strip by Does Not Apply route See Admin Instructions. Provider, Historical   Insulin Needles, Disposable, (BD INSULIN PEN NEEDLE UF SHORT) 31 X 5/16 \" Ndle UAD - USE AS DIRECTED 10/11/10   Eloisa Arias DO   aspirin delayed-release 81 mg tablet Take 81 mg by mouth daily. Provider, Historical     Allergies   Allergen Reactions    Bee Sting [Sting, Bee] Swelling    Fish Containing Products Hives    Lamisil  [Terbinafine Hcl] Unknown (comments)    Lisinopril Hives     hives and swollen lip. Other reaction(s): Rash, swelling  Other reaction(s): Rash, swelling    Shellfish Derived Hives    Terbinafine Rash     Other reaction(s): Other (see comments)  Other reaction(s): Red Spots     Past Medical History:   Diagnosis Date    Benign pancreatic islet cell tumors     2001    Diabetes ([facility] [address]     Migraine     Thyroid disease     hyperthyroid     Past Surgical History:   Procedure Laterality Date    HX BREAST REDUCTION Bilateral 2004    HX SPLENECTOMY      2002    MS ABDOMEN SURGERY PROC UNLISTED         Review of Systems   Constitutional: Negative for chills, fever and malaise/fatigue. Respiratory: Negative. Cardiovascular: Negative. Musculoskeletal: Negative. Skin: Negative. Neurological: Positive for dizziness. Negative for tingling, tremors, speech change, focal weakness and headaches.        No data recorded     Lady Fuchs was evaluated through a patient-initiated, synchronous (real-time) audio only encounter. She (or guardian if applicable) is aware that it is a billable service, which includes applicable co-pays, with coverage as determined by her insurance carrier. This visit was conducted with the patient's (and/or Jillian Gray guardian's) verbal consent. She has not had a related appointment within my department in the past 7 days or scheduled within the next 24 hours. The patient was located in a state where the provider was licensed to provide care.     Total Time: minutes: 11-20 minutes    Cabrera Phipps NP

## 2022-03-28 DIAGNOSIS — M25.531 RIGHT WRIST PAIN: ICD-10-CM

## 2022-03-28 DIAGNOSIS — E11.42 TYPE 2 DIABETES MELLITUS WITH DIABETIC POLYNEUROPATHY, WITH LONG-TERM CURRENT USE OF INSULIN (HCC): ICD-10-CM

## 2022-03-28 DIAGNOSIS — Z79.4 TYPE 2 DIABETES MELLITUS WITH DIABETIC POLYNEUROPATHY, WITH LONG-TERM CURRENT USE OF INSULIN (HCC): ICD-10-CM

## 2022-03-28 DIAGNOSIS — M79.604 PAIN IN BOTH LOWER EXTREMITIES: ICD-10-CM

## 2022-03-28 DIAGNOSIS — M79.605 PAIN IN BOTH LOWER EXTREMITIES: ICD-10-CM

## 2022-03-28 RX ORDER — GABAPENTIN 300 MG/1
CAPSULE ORAL
Qty: 30 CAPSULE | Refills: 0 | Status: SHIPPED | OUTPATIENT
Start: 2022-03-28 | End: 2022-04-27

## 2022-03-28 NOTE — TELEPHONE ENCOUNTER
----- Message from Henry Adams sent at 3/28/2022  3:52 PM EDT -----  Subject: Refill Request    QUESTIONS  Name of Medication? gabapentin (NEURONTIN) 300 mg capsule  Patient-reported dosage and instructions? 1 tab at night  How many days do you have left? 0  Preferred Pharmacy? [id number]  Pharmacy phone number (if available)? [phone]  ---------------------------------------------------------------------------  --------------  Honey Mule INFO  What is the best way for the office to contact you? OK to leave message on   voicemail  Preferred Call Back Phone Number?  [phone]

## 2022-04-27 DIAGNOSIS — M79.604 PAIN IN BOTH LOWER EXTREMITIES: ICD-10-CM

## 2022-04-27 DIAGNOSIS — M25.531 RIGHT WRIST PAIN: ICD-10-CM

## 2022-04-27 DIAGNOSIS — E11.42 TYPE 2 DIABETES MELLITUS WITH DIABETIC POLYNEUROPATHY, WITH LONG-TERM CURRENT USE OF INSULIN (HCC): ICD-10-CM

## 2022-04-27 DIAGNOSIS — G43.001 MIGRAINE WITHOUT AURA AND WITH STATUS MIGRAINOSUS, NOT INTRACTABLE: ICD-10-CM

## 2022-04-27 DIAGNOSIS — D50.9 IRON DEFICIENCY ANEMIA, UNSPECIFIED IRON DEFICIENCY ANEMIA TYPE: ICD-10-CM

## 2022-04-27 DIAGNOSIS — Z79.4 TYPE 2 DIABETES MELLITUS WITH DIABETIC POLYNEUROPATHY, WITH LONG-TERM CURRENT USE OF INSULIN (HCC): ICD-10-CM

## 2022-04-27 DIAGNOSIS — M79.605 PAIN IN BOTH LOWER EXTREMITIES: ICD-10-CM

## 2022-04-27 RX ORDER — GABAPENTIN 300 MG/1
CAPSULE ORAL
Qty: 30 CAPSULE | Refills: 0 | Status: SHIPPED | OUTPATIENT
Start: 2022-04-27 | End: 2022-06-20

## 2022-04-27 RX ORDER — TOPIRAMATE 50 MG/1
TABLET, FILM COATED ORAL
Qty: 180 TABLET | Refills: 0 | Status: SHIPPED | OUTPATIENT
Start: 2022-04-27

## 2022-04-27 RX ORDER — FERROUS GLUCONATE 324(37.5)
TABLET ORAL
Qty: 90 TABLET | Refills: 2 | Status: SHIPPED | OUTPATIENT
Start: 2022-04-27 | End: 2022-09-08

## 2022-06-06 ENCOUNTER — TELEPHONE (OUTPATIENT)
Dept: INTERNAL MEDICINE CLINIC | Age: 52
End: 2022-06-06

## 2022-06-08 NOTE — TELEPHONE ENCOUNTER
Pt returned call to writer and left message to call back. Tried to contact pt in regards to scheduling follow up with PCP, no answer, VM Full.

## 2022-06-19 DIAGNOSIS — M79.605 PAIN IN BOTH LOWER EXTREMITIES: ICD-10-CM

## 2022-06-19 DIAGNOSIS — Z79.4 TYPE 2 DIABETES MELLITUS WITH DIABETIC POLYNEUROPATHY, WITH LONG-TERM CURRENT USE OF INSULIN (HCC): ICD-10-CM

## 2022-06-19 DIAGNOSIS — E11.42 TYPE 2 DIABETES MELLITUS WITH DIABETIC POLYNEUROPATHY, WITH LONG-TERM CURRENT USE OF INSULIN (HCC): ICD-10-CM

## 2022-06-19 DIAGNOSIS — M25.531 RIGHT WRIST PAIN: ICD-10-CM

## 2022-06-19 DIAGNOSIS — M79.604 PAIN IN BOTH LOWER EXTREMITIES: ICD-10-CM

## 2022-06-20 RX ORDER — GABAPENTIN 300 MG/1
CAPSULE ORAL
Qty: 30 CAPSULE | Refills: 0 | Status: SHIPPED | OUTPATIENT
Start: 2022-06-20 | End: 2022-09-08 | Stop reason: SDUPTHER

## 2022-07-28 ENCOUNTER — TELEPHONE (OUTPATIENT)
Dept: INTERNAL MEDICINE CLINIC | Age: 52
End: 2022-07-28

## 2022-07-28 NOTE — TELEPHONE ENCOUNTER
Call placed to pt, she needs an in office appointment for follow up. No answer and VM is full.      My chart message sent

## 2022-08-08 ENCOUNTER — TRANSCRIBE ORDER (OUTPATIENT)
Dept: SCHEDULING | Age: 52
End: 2022-08-08

## 2022-08-08 DIAGNOSIS — Z12.31 SCREENING MAMMOGRAM FOR HIGH-RISK PATIENT: Primary | ICD-10-CM

## 2022-09-08 ENCOUNTER — OFFICE VISIT (OUTPATIENT)
Dept: INTERNAL MEDICINE CLINIC | Age: 52
End: 2022-09-08

## 2022-09-08 VITALS
WEIGHT: 222 LBS | SYSTOLIC BLOOD PRESSURE: 132 MMHG | RESPIRATION RATE: 12 BRPM | TEMPERATURE: 97.9 F | DIASTOLIC BLOOD PRESSURE: 86 MMHG | OXYGEN SATURATION: 99 % | HEIGHT: 66 IN | BODY MASS INDEX: 35.68 KG/M2 | HEART RATE: 66 BPM

## 2022-09-08 DIAGNOSIS — M79.604 PAIN IN BOTH LOWER EXTREMITIES: ICD-10-CM

## 2022-09-08 DIAGNOSIS — E11.42 TYPE 2 DIABETES MELLITUS WITH DIABETIC POLYNEUROPATHY, WITH LONG-TERM CURRENT USE OF INSULIN (HCC): Primary | ICD-10-CM

## 2022-09-08 DIAGNOSIS — M25.531 RIGHT WRIST PAIN: ICD-10-CM

## 2022-09-08 DIAGNOSIS — I10 ESSENTIAL HYPERTENSION: ICD-10-CM

## 2022-09-08 DIAGNOSIS — E66.01 SEVERE OBESITY (BMI 35.0-39.9) WITH COMORBIDITY (HCC): ICD-10-CM

## 2022-09-08 DIAGNOSIS — Z79.4 TYPE 2 DIABETES MELLITUS WITH DIABETIC POLYNEUROPATHY, WITH LONG-TERM CURRENT USE OF INSULIN (HCC): Primary | ICD-10-CM

## 2022-09-08 DIAGNOSIS — R42 VERTIGO: ICD-10-CM

## 2022-09-08 DIAGNOSIS — M79.605 PAIN IN BOTH LOWER EXTREMITIES: ICD-10-CM

## 2022-09-08 PROCEDURE — 99214 OFFICE O/P EST MOD 30 MIN: CPT | Performed by: INTERNAL MEDICINE

## 2022-09-08 RX ORDER — MECLIZINE HYDROCHLORIDE 25 MG/1
25 TABLET ORAL
Qty: 30 TABLET | Refills: 0 | Status: SHIPPED | OUTPATIENT
Start: 2022-09-08 | End: 2022-09-18

## 2022-09-08 RX ORDER — GABAPENTIN 300 MG/1
CAPSULE ORAL
Qty: 30 CAPSULE | Refills: 0 | Status: SHIPPED | OUTPATIENT
Start: 2022-09-08

## 2022-09-08 RX ORDER — SITAGLIPTIN 100 MG/1
TABLET, FILM COATED ORAL
COMMUNITY
Start: 2022-08-29

## 2022-09-08 RX ORDER — INSULIN LISPRO 100 [IU]/ML
INJECTION, SOLUTION INTRAVENOUS; SUBCUTANEOUS
COMMUNITY
Start: 2022-08-28

## 2022-09-08 NOTE — PROGRESS NOTES
Health Maintenance Due   Topic Date Due    Hepatitis C Screening  Never done    COVID-19 Vaccine (1) Never done    MenB Meningococcal topic (1 of 4 - Increased Risk Bexsero 2-dose series) Never done    Shingrix Vaccine Age 50> (1 of 2) Never done    DTaP/Tdap/Td series (1 - Tdap) Never done    Cervical cancer screen  Never done    Colorectal Cancer Screening Combo  Never done    Pneumococcal 0-64 years (2 - PPSV23 or PCV20) 11/08/2017    MICROALBUMIN Q1  08/08/2019    Foot Exam Q1  02/16/2022    Eye Exam Retinal or Dilated  02/26/2022    Depression Screen  06/04/2022    Lipid Screen  06/15/2022    Flu Vaccine (1) 09/01/2022       Chief Complaint   Patient presents with    Follow Up Chronic Condition    Headache    Dizziness    Medication Refill       1. Have you been to the ER, urgent care clinic since your last visit? Hospitalized since your last visit? No    2. Have you seen or consulted any other health care providers outside of the [address] [place] [address] since your last visit? Include any pap smears or colon screening. No    3) Do you have an Advance Directive on file? no    4) Are you interested in receiving information on Advance Directives? NO      Patient is accompanied by self I have received verbal consent from [address] to discuss any/all medical information while they are present in the room.

## 2022-09-08 NOTE — PROGRESS NOTES
HISTORY OF PRESENT ILLNESS  Konstantin Liao is a 46 y.o. female. Patient was seen for a follow up. PMH of DM, HTN, obese and hyperthyroid. Patient reports that she would like to seen another endocrine provider. Reports that she is having trouble adjusting her medications to her BS. Reports that she has hypoglycemic episodes several times a week. BS in 70's and below. Had her insulin adjusted in the last few weeks but feels like her BS are not controlled. Reports a HA and dizziness but this was prior to these episode. Last a1c per patient was 9. Will get lab work sent over. Reports that she is on Humalog, victoza, and januvia   HTN: stable. Eating a low salt diet. Visit Vitals  /86 (BP 1 Location: Left upper arm, BP Patient Position: Sitting, BP Cuff Size: Adult)   Pulse 66   Temp 97.9 °F (36.6 °C) (Oral)   Resp 12   Ht 5' 6\" (1.676 m)   Wt 222 lb (100.7 kg)   LMP  (LMP Unknown)   SpO2 99%   BMI 35.83 kg/m²     Past Medical History:   Diagnosis Date    Benign pancreatic islet cell tumors     2001    Diabetes ([facility] [address]     Migraine     Thyroid disease     hyperthyroid     Outpatient Encounter Medications as of 9/8/2022   Medication Sig Dispense Refill    blood-glucose meter,continuous (DEXCOM G6 ) use to check blood sugars      HumaLOG KwikPen Insulin 100 unit/mL kwikpen       Januvia 100 mg tablet       soy isofl/blk coh/gr tea/yerba (ESTROVEN ENERGY PO) Take  by mouth.      gabapentin (NEURONTIN) 300 mg capsule Take 1 tablet, three times a day as needed 30 Capsule 0    meclizine (ANTIVERT) 25 mg tablet Take 1 Tablet by mouth three (3) times daily as needed for Dizziness for up to 10 days. 30 Tablet 0    topiramate (TOPAMAX) 50 mg tablet TAKE 1 TABLET BY MOUTH TWICE A  Tablet 0    liraglutide (Victoza 3-Franklin) 0.6 mg/0.1 mL (18 mg/3 mL) pnij inject 1.8mg      cholecalciferol (VITAMIN D3) (1000 Units /25 mcg) tablet Take  by mouth daily.       amLODIPine (NORVASC) 10 mg tablet TAKE 1 TABLET BY MOUTH EVERY DAY  3    famotidine (PEPCID) 40 mg tablet Take 40 mg by mouth daily. fexofenadine (ALLEGRA) 180 mg tablet Take  by mouth.      levothyroxine (SYNTHROID) 125 mcg tablet TAKE 1 TABLET EVERY DAY  2    glucose blood VI test strips strip by Does Not Apply route See Admin Instructions. Insulin Needles, Disposable, (BD INSULIN PEN NEEDLE UF SHORT) 31 X 5/16 \" Ndle UAD - USE AS DIRECTED 100 Package 5    aspirin delayed-release 81 mg tablet Take 81 mg by mouth daily. [DISCONTINUED] gabapentin (NEURONTIN) 300 mg capsule TAKE 1 CAP BY MOUTH THREE (3) TIMES DAILY AS NEEDED FOR PAIN. MAX DAILY AMOUNT: 900 MG. 30 Capsule 0    [DISCONTINUED] ferrous gluconate 324 mg (37.5 mg iron) tablet TAKE 1 TABLET BY MOUTH EVERY DAY BEFORE BREAKFAST (Patient not taking: Reported on 9/8/2022) 90 Tablet 2    [DISCONTINUED] SUMAtriptan (IMITREX) 100 mg tablet TAKE 1 TABLET EVERY DAY AS NEEDED MIGRAINE (Patient not taking: Reported on 9/8/2022) 9 Tablet 5    [DISCONTINUED] alogliptin-metFORMIN (KAZANO) 12.5-1,000 mg per tablet Take 12 mg by mouth two (2) times a day. (Patient not taking: Reported on 9/8/2022)      [DISCONTINUED] Basaglar KwikPen U-100 Insulin 100 unit/mL (3 mL) inpn  (Patient not taking: Reported on 9/8/2022)      [DISCONTINUED] diclofenac EC (VOLTAREN) 75 mg EC tablet TAKE 1 (ONE) TABLET BY MOUTH TWO TIMES DAILY, TAKE WITH FOOD (Patient not taking: Reported on 9/8/2022)  1    BD INSULIN PEN NEEDLE UF MINI 31 gauge x 3/16\" ndle USE AS DIRECTED FOR INSULIN INJECTIONS,FOUR TIMES A DAY SUBCUTANEOUSLY FOR 30 DAYS 30  11    [DISCONTINUED] APIDRA SOLOSTAR 100 unit/mL pen INJECT 11 UNITS PLUS 2 UNITS FOR EVERY 25PTS > 100 WITH MEALS THREE TIMES A DAY SUBCUTANEOUS 30 (Patient not taking: Reported on 9/8/2022)  2    desogestrel-ethinyl estradiol (DESOGEN) 0.15-0.03 mg per tablet Take 1 Tab by mouth daily.  (Patient not taking: Reported on 9/8/2022)      [DISCONTINUED] diphenhydrAMINE (BENADRYL) 25 mg capsule Take 25 mg by mouth every six (6) hours as needed. (Patient not taking: Reported on 9/8/2022)       No facility-administered encounter medications on file as of 9/8/2022. HPI    Review of Systems   Constitutional: Negative. Eyes:  Negative for blurred vision and double vision. Respiratory: Negative. Cardiovascular: Negative. Gastrointestinal: Negative. Musculoskeletal: Negative. Neurological:  Positive for dizziness and headaches. Psychiatric/Behavioral: Negative. Physical Exam  Vitals and nursing note reviewed. Cardiovascular:      Rate and Rhythm: Normal rate and regular rhythm. Pulmonary:      Effort: Pulmonary effort is normal.      Breath sounds: Normal breath sounds. Abdominal:      Palpations: Abdomen is soft. Musculoskeletal:      Right lower leg: No edema. Left lower leg: No edema. Skin:     General: Skin is warm. Neurological:      Mental Status: She is alert and oriented to person, place, and time. Psychiatric:         Mood and Affect: Mood is anxious. ASSESSMENT and PLAN  Diagnoses and all orders for this visit:    1. Type 2 diabetes mellitus with diabetic polyneuropathy, with long-term current use of insulin (HCC)  -     gabapentin (NEURONTIN) 300 mg capsule; Take 1 tablet, three times a day as needed        -     will refer to another endocrine provider . Asked for labs to get sent. I will adjust until reassessed   2. Severe obesity (BMI 35.0-39. 9) with comorbidity ([address]    3. Essential hypertension    4. Pain in both lower extremities  -     gabapentin (NEURONTIN) 300 mg capsule; Take 1 tablet, three times a day as needed    5. Right wrist pain  -     gabapentin (NEURONTIN) 300 mg capsule; Take 1 tablet, three times a day as needed    6. Vertigo  -     meclizine (ANTIVERT) 25 mg tablet; Take 1 Tablet by mouth three (3) times daily as needed for Dizziness for up to 10 days.       Follow-up and Dispositions    Return in about 3 months (around 12/8/2022), or if symptoms worsen or fail to improve.       lab results and schedule of future lab studies reviewed with patient  reviewed diet, exercise and weight control  reviewed medications and side effects in detail  specific diabetic recommendations: home glucose monitoring emphasized, foot care discussed and Podiatry visits discussed, glycohemoglobin and other lab monitoring discussed, and long term diabetic complications discussed

## 2022-10-17 ENCOUNTER — OFFICE VISIT (OUTPATIENT)
Dept: URGENT CARE | Age: 52
End: 2022-10-17
Payer: COMMERCIAL

## 2022-10-17 VITALS
TEMPERATURE: 98 F | DIASTOLIC BLOOD PRESSURE: 83 MMHG | BODY MASS INDEX: 35.83 KG/M2 | WEIGHT: 222 LBS | SYSTOLIC BLOOD PRESSURE: 134 MMHG | RESPIRATION RATE: 16 BRPM | HEART RATE: 89 BPM | OXYGEN SATURATION: 100 %

## 2022-10-17 DIAGNOSIS — H61.23 BILATERAL IMPACTED CERUMEN: Primary | ICD-10-CM

## 2022-10-17 PROCEDURE — 99212 OFFICE O/P EST SF 10 MIN: CPT | Performed by: FAMILY MEDICINE

## 2022-10-17 PROCEDURE — 69210 REMOVE IMPACTED EAR WAX UNI: CPT | Performed by: FAMILY MEDICINE

## 2022-10-17 NOTE — PROGRESS NOTES
Ear Pain  This is a recurrent problem. The current episode started more than 1 week ago. The problem occurs constantly. The problem has been gradually worsening. Associated symptoms comments: Rt ear fullness- feels plugged and decreased hearing   Has problem with ear wax build up   No injury. Nothing aggravates the symptoms. Nothing relieves the symptoms. Past Medical History:   Diagnosis Date    Benign pancreatic islet cell tumors     2001    Diabetes ([address]     Migraine     Thyroid disease     hyperthyroid        Past Surgical History:   Procedure Laterality Date    HX BREAST REDUCTION Bilateral 2004    HX SPLENECTOMY      2002    [place] ABDOMEN SURGERY PROC UNLISTED           Family History   Problem Relation Age of Onset    Diabetes Mother     Elevated Lipids Mother     Hypertension Mother     Arthritis-rheumatoid Mother     Diabetes Father     Stroke Father         Social History     Socioeconomic History    Marital status: SINGLE     Spouse name: Not on file    Number of children: Not on file    Years of education: Not on file    Highest education level: Not on file   Occupational History    Not on file   Tobacco Use    Smoking status: Never    Smokeless tobacco: Never   Vaping Use    Vaping Use: Never used   Substance and Sexual Activity    Alcohol use: No    Drug use: No    Sexual activity: Never     Comment: single has one child   Other Topics Concern    Not on file   Social History Narrative    Not on file     Social Determinants of Health     Financial Resource Strain: Not on file   Food Insecurity: Not on file   Transportation Needs: Not on file   Physical Activity: Not on file   Stress: Not on file   Social Connections: Not on file   Intimate Partner Violence: Not on file   Housing Stability: Not on file                ALLERGIES: Bee sting [sting, bee]; Fish containing products; Lamisil  [terbinafine hcl]; Lisinopril;  Shellfish derived; and Terbinafine    Review of Systems   HENT:  Positive for ear pain.    All other systems reviewed and are negative. Vitals:    10/17/22 1722 10/17/22 1725   BP:  134/83   Pulse:  89   Resp:  16   Temp:  98 °F (36.7 °C)   SpO2:  100%   Weight: 222 lb (100.7 kg)        Physical Exam  HENT:      Right Ear: There is impacted cerumen. Left Ear: There is impacted cerumen. Ears:      Comments: Rt > left - TM not visible on Rt     Ear irrigation done-   Rt ear partially cleared- able to see TM post irrigation on rt side       MDM    Procedures        ICD-10-CM ICD-9-CM    1. Bilateral impacted cerumen  H61.23 380.4 REMOVE IMPACTED EAR WAX          Bilateral ea irrigation done     No orders of the defined types were placed in this encounter. No results found for any visits on 10/17/22. The patients condition was discussed with the patient and they understand. The patient is to follow up with primary care doctor. If signs and symptoms become worse the pt is to go to the ER. The patient is to take medications as prescribed.

## 2022-10-27 ENCOUNTER — HOSPITAL ENCOUNTER (OUTPATIENT)
Dept: MAMMOGRAPHY | Age: 52
Discharge: HOME | End: 2022-10-27
Attending: INTERNAL MEDICINE
Payer: COMMERCIAL

## 2022-10-27 DIAGNOSIS — Z12.31 SCREENING MAMMOGRAM FOR HIGH-RISK PATIENT: ICD-10-CM

## 2022-10-27 PROCEDURE — 77063 BREAST TOMOSYNTHESIS BI: CPT

## 2022-11-08 DIAGNOSIS — Z79.4 TYPE 2 DIABETES MELLITUS WITH DIABETIC POLYNEUROPATHY, WITH LONG-TERM CURRENT USE OF INSULIN (HCC): ICD-10-CM

## 2022-11-08 DIAGNOSIS — M79.604 PAIN IN BOTH LOWER EXTREMITIES: ICD-10-CM

## 2022-11-08 DIAGNOSIS — M25.531 RIGHT WRIST PAIN: ICD-10-CM

## 2022-11-08 DIAGNOSIS — D50.9 IRON DEFICIENCY ANEMIA, UNSPECIFIED IRON DEFICIENCY ANEMIA TYPE: ICD-10-CM

## 2022-11-08 DIAGNOSIS — E11.42 TYPE 2 DIABETES MELLITUS WITH DIABETIC POLYNEUROPATHY, WITH LONG-TERM CURRENT USE OF INSULIN (HCC): ICD-10-CM

## 2022-11-08 DIAGNOSIS — M79.605 PAIN IN BOTH LOWER EXTREMITIES: ICD-10-CM

## 2022-11-08 DIAGNOSIS — G43.001 MIGRAINE WITHOUT AURA AND WITH STATUS MIGRAINOSUS, NOT INTRACTABLE: ICD-10-CM

## 2022-11-08 RX ORDER — TOPIRAMATE 50 MG/1
50 TABLET, FILM COATED ORAL 2 TIMES DAILY
Qty: 180 TABLET | Refills: 0 | Status: SHIPPED | OUTPATIENT
Start: 2022-11-08

## 2022-11-08 RX ORDER — GABAPENTIN 300 MG/1
CAPSULE ORAL
Qty: 30 CAPSULE | Refills: 0 | Status: SHIPPED | OUTPATIENT
Start: 2022-11-08 | End: 2022-11-28

## 2022-11-08 RX ORDER — GABAPENTIN 300 MG/1
CAPSULE ORAL
Qty: 30 CAPSULE | Refills: 0 | Status: SHIPPED | OUTPATIENT
Start: 2022-11-08

## 2022-11-08 RX ORDER — FERROUS GLUCONATE 324(38)MG
TABLET ORAL
Qty: 90 TABLET | Refills: 0 | Status: SHIPPED | OUTPATIENT
Start: 2022-11-08

## 2022-11-08 NOTE — TELEPHONE ENCOUNTER
Requested Prescriptions     Pending Prescriptions Disp Refills    gabapentin (NEURONTIN) 300 mg capsule 30 Capsule 0     Sig: Take 1 tablet, three times a day as needed    topiramate (TOPAMAX) 50 mg tablet 180 Tablet 0     Sig: Take 1 Tablet by mouth two (2) times a day. ferrous gluconate 324 mg (38 mg iron) tablet       Sig: Take  by mouth Daily (before breakfast).      09/08/2022  No upcoming    [facility]

## 2022-11-27 DIAGNOSIS — M25.531 RIGHT WRIST PAIN: ICD-10-CM

## 2022-11-27 DIAGNOSIS — M79.605 PAIN IN BOTH LOWER EXTREMITIES: ICD-10-CM

## 2022-11-27 DIAGNOSIS — E11.42 TYPE 2 DIABETES MELLITUS WITH DIABETIC POLYNEUROPATHY, WITH LONG-TERM CURRENT USE OF INSULIN (HCC): ICD-10-CM

## 2022-11-27 DIAGNOSIS — M79.604 PAIN IN BOTH LOWER EXTREMITIES: ICD-10-CM

## 2022-11-27 DIAGNOSIS — Z79.4 TYPE 2 DIABETES MELLITUS WITH DIABETIC POLYNEUROPATHY, WITH LONG-TERM CURRENT USE OF INSULIN (HCC): ICD-10-CM

## 2022-11-28 RX ORDER — GABAPENTIN 300 MG/1
CAPSULE ORAL
Qty: 30 CAPSULE | Refills: 0 | Status: SHIPPED | OUTPATIENT
Start: 2022-11-28

## 2023-01-12 DIAGNOSIS — D50.9 IRON DEFICIENCY ANEMIA, UNSPECIFIED IRON DEFICIENCY ANEMIA TYPE: ICD-10-CM

## 2023-01-12 RX ORDER — FERROUS GLUCONATE 324(38)MG
TABLET ORAL
Qty: 90 TABLET | Refills: 0 | Status: SHIPPED | OUTPATIENT
Start: 2023-01-12

## 2023-01-12 NOTE — TELEPHONE ENCOUNTER
Requested Prescriptions     Pending Prescriptions Disp Refills    ferrous gluconate 324 mg (38 mg iron) tablet 90 Tablet 0     Sig: TAKE 1 TABLET BY MOUTH EVERY DAY BEFORE [address]  [address] [place] [address]  Phone: [phone] Fax: [phone]       9/8/2022 is LAST OFFICE VISIT     No future appointments.

## 2023-01-19 LAB
HBA1C MFR BLD HPLC: 7.9 %
HBA1C MFR BLD HPLC: 7.9 %

## 2023-02-05 DIAGNOSIS — G43.001 MIGRAINE WITHOUT AURA AND WITH STATUS MIGRAINOSUS, NOT INTRACTABLE: ICD-10-CM

## 2023-02-05 RX ORDER — TOPIRAMATE 50 MG/1
TABLET, FILM COATED ORAL
Qty: 180 TABLET | Refills: 0 | Status: SHIPPED | OUTPATIENT
Start: 2023-02-05

## 2023-02-07 DIAGNOSIS — M25.531 RIGHT WRIST PAIN: ICD-10-CM

## 2023-02-07 DIAGNOSIS — M79.604 PAIN IN BOTH LOWER EXTREMITIES: ICD-10-CM

## 2023-02-07 DIAGNOSIS — E11.42 TYPE 2 DIABETES MELLITUS WITH DIABETIC POLYNEUROPATHY, WITH LONG-TERM CURRENT USE OF INSULIN (HCC): ICD-10-CM

## 2023-02-07 DIAGNOSIS — M79.605 PAIN IN BOTH LOWER EXTREMITIES: ICD-10-CM

## 2023-02-07 DIAGNOSIS — Z79.4 TYPE 2 DIABETES MELLITUS WITH DIABETIC POLYNEUROPATHY, WITH LONG-TERM CURRENT USE OF INSULIN (HCC): ICD-10-CM

## 2023-02-07 RX ORDER — GABAPENTIN 300 MG/1
CAPSULE ORAL
Qty: 30 CAPSULE | Refills: 0 | Status: SHIPPED | OUTPATIENT
Start: 2023-02-07

## 2023-02-07 NOTE — TELEPHONE ENCOUNTER
Requested Prescriptions     Pending Prescriptions Disp Refills    gabapentin (NEURONTIN) 300 mg capsule 30 Capsule 0     Sig: Take 1 tablet, three times a day as needed         [facility] [address] [place] [address] [place] [address]  Phone: [phone] Fax: [phone]       9/8/2022 is LAST OFFICE VISIT     No future appointments.

## 2023-05-14 DIAGNOSIS — G43.009 MIGRAINE WITHOUT AURA, NOT INTRACTABLE, WITHOUT STATUS MIGRAINOSUS: Primary | ICD-10-CM

## 2023-05-15 RX ORDER — GABAPENTIN 300 MG/1
CAPSULE ORAL
Qty: 30 CAPSULE | Refills: 0 | OUTPATIENT
Start: 2023-05-15 | End: 2023-06-14

## 2023-05-15 RX ORDER — TOPIRAMATE 50 MG/1
TABLET, FILM COATED ORAL
Qty: 180 TABLET | Refills: 0 | OUTPATIENT
Start: 2023-05-15

## 2023-05-21 DIAGNOSIS — G43.809 OTHER MIGRAINE WITHOUT STATUS MIGRAINOSUS, NOT INTRACTABLE: Primary | ICD-10-CM

## 2023-05-22 RX ORDER — TOPIRAMATE 50 MG/1
TABLET, FILM COATED ORAL
Qty: 180 TABLET | Refills: 0 | OUTPATIENT
Start: 2023-05-22

## 2023-06-06 LAB
CREATININE, EXTERNAL: 0.9
HBA1C MFR BLD HPLC: 7.3 %
LDL CHOLESTEROL, EXTERNAL: 79
MICROALBUMIN URINE, EXTERNAL: 13

## 2023-06-20 DIAGNOSIS — M25.511 CHRONIC RIGHT SHOULDER PAIN: Primary | ICD-10-CM

## 2023-06-20 DIAGNOSIS — G89.29 CHRONIC RIGHT SHOULDER PAIN: Primary | ICD-10-CM

## 2023-06-20 DIAGNOSIS — M25.559 HIP PAIN, UNSPECIFIED LATERALITY: ICD-10-CM

## 2023-06-21 RX ORDER — GABAPENTIN 300 MG/1
CAPSULE ORAL
Qty: 30 CAPSULE | Refills: 0 | OUTPATIENT
Start: 2023-06-21 | End: 2023-09-19

## 2023-07-09 DIAGNOSIS — E11.42 TYPE 2 DIABETES MELLITUS WITH DIABETIC POLYNEUROPATHY, UNSPECIFIED WHETHER LONG TERM INSULIN USE (HCC): ICD-10-CM

## 2023-07-09 DIAGNOSIS — G43.901 MIGRAINE WITH STATUS MIGRAINOSUS, NOT INTRACTABLE, UNSPECIFIED MIGRAINE TYPE: Primary | ICD-10-CM

## 2023-07-10 RX ORDER — TOPIRAMATE 50 MG/1
TABLET, FILM COATED ORAL
Qty: 180 TABLET | Refills: 0 | OUTPATIENT
Start: 2023-07-10

## 2023-07-10 RX ORDER — GABAPENTIN 300 MG/1
CAPSULE ORAL
Qty: 270 CAPSULE | Refills: 0 | OUTPATIENT
Start: 2023-07-10 | End: 2023-10-08

## 2023-07-10 NOTE — TELEPHONE ENCOUNTER
Requested Prescriptions     Pending Prescriptions Disp Refills    gabapentin (NEURONTIN) 300 MG capsule [Pharmacy Med Name: Gabapentin 300 MG Oral Capsule] 270 capsule 0     Sig: TAKE 1 CAPSULE BY MOUTH THREE TIMES DAILY AS NEEDED . APPOINTMENT REQUIRED FOR FUTURE REFILLS    topiramate (TOPAMAX) 50 MG tablet [Pharmacy Med Name: Topiramate 50 MG Oral Tablet] 180 tablet 0     Sig: Take 1 tablet by mouth twice daily         [facility] [address] NIKA APODACAWILI [address], [address] [facility] [phone] Adrianna Argueta [phone]  [address] [place] [address]  Phone: [phone] Fax: [phone]       Last appt 9/8/2022      No future appointments.

## 2023-08-04 ENCOUNTER — NURSE TRIAGE (OUTPATIENT)
Dept: OTHER | Facility: CLINIC | Age: 53
End: 2023-08-04

## 2023-08-04 NOTE — TELEPHONE ENCOUNTER
Location of patient: [facility]    Received call from Chad at [facility] with [employer]. Subjective: Caller states \"sciatica\"     Current Symptoms: sciatica both legs , having tingling and numbness in both legs hx of sciatica in the past , no new or different in bowls or bladder hx htn and diabetes , is walking slower due to pain but still working , states numbness and tingling goes all the way down to feet both sides for several months     Onset: over a couple months probably 4-5    Associated Symptoms: NA    Pain Severity: 10/10    Temperature: none       What has been tried: heat    LMP:  none  Pregnant: No    Recommended disposition: See PCP within 3 Days    Care advice provided, patient verbalizes understanding; denies any other questions or concerns; instructed to call back for any new or worsening symptoms. Patient/Caller agrees with recommended disposition; writer provided warm transfer to [employer] at [facility] for appointment scheduling    Attention Provider: Thank you for allowing me to participate in the care of your patient. The patient was connected to triage in response to information provided to the ECC/PSC. Please do not respond through this encounter as the response is not directed to a shared pool.          Reason for Disposition   MODERATE back pain (e.g., interferes with normal activities) and present > 3 days    Protocols used: Back Pain-ADULT-[place]

## 2023-08-11 ENCOUNTER — OFFICE VISIT (OUTPATIENT)
Age: 53
End: 2023-08-11
Payer: COMMERCIAL

## 2023-08-11 VITALS
HEIGHT: 66 IN | HEART RATE: 81 BPM | SYSTOLIC BLOOD PRESSURE: 118 MMHG | RESPIRATION RATE: 16 BRPM | DIASTOLIC BLOOD PRESSURE: 80 MMHG | WEIGHT: 224 LBS | OXYGEN SATURATION: 99 % | BODY MASS INDEX: 36 KG/M2

## 2023-08-11 DIAGNOSIS — G89.29 CHRONIC BILATERAL LOW BACK PAIN WITH BILATERAL SCIATICA: ICD-10-CM

## 2023-08-11 DIAGNOSIS — M54.42 CHRONIC BILATERAL LOW BACK PAIN WITH BILATERAL SCIATICA: ICD-10-CM

## 2023-08-11 DIAGNOSIS — E66.01 MORBID (SEVERE) OBESITY DUE TO EXCESS CALORIES (HCC): ICD-10-CM

## 2023-08-11 DIAGNOSIS — M79.671 FOOT PAIN, BILATERAL: ICD-10-CM

## 2023-08-11 DIAGNOSIS — D50.9 IRON DEFICIENCY ANEMIA, UNSPECIFIED IRON DEFICIENCY ANEMIA TYPE: ICD-10-CM

## 2023-08-11 DIAGNOSIS — M79.672 FOOT PAIN, BILATERAL: ICD-10-CM

## 2023-08-11 DIAGNOSIS — M54.41 CHRONIC BILATERAL LOW BACK PAIN WITH BILATERAL SCIATICA: ICD-10-CM

## 2023-08-11 DIAGNOSIS — I10 ESSENTIAL (PRIMARY) HYPERTENSION: ICD-10-CM

## 2023-08-11 DIAGNOSIS — G43.809 OTHER MIGRAINE WITHOUT STATUS MIGRAINOSUS, NOT INTRACTABLE: ICD-10-CM

## 2023-08-11 DIAGNOSIS — E11.42 TYPE 2 DIABETES MELLITUS WITH DIABETIC POLYNEUROPATHY, UNSPECIFIED WHETHER LONG TERM INSULIN USE (HCC): Primary | ICD-10-CM

## 2023-08-11 PROCEDURE — 99214 OFFICE O/P EST MOD 30 MIN: CPT | Performed by: INTERNAL MEDICINE

## 2023-08-11 PROCEDURE — 3079F DIAST BP 80-89 MM HG: CPT | Performed by: INTERNAL MEDICINE

## 2023-08-11 PROCEDURE — 3074F SYST BP LT 130 MM HG: CPT | Performed by: INTERNAL MEDICINE

## 2023-08-11 RX ORDER — GABAPENTIN 300 MG/1
CAPSULE ORAL
Qty: 180 CAPSULE | Refills: 1 | Status: SHIPPED | OUTPATIENT
Start: 2023-08-11 | End: 2023-11-10

## 2023-08-11 RX ORDER — TOPIRAMATE 50 MG/1
50 TABLET, FILM COATED ORAL 2 TIMES DAILY
Qty: 120 TABLET | Refills: 1 | Status: SHIPPED | OUTPATIENT
Start: 2023-08-11

## 2023-08-11 RX ORDER — EPINEPHRINE 0.3 MG/.3ML
INJECTION, SOLUTION INTRAMUSCULAR
COMMUNITY
Start: 2023-06-15

## 2023-08-11 RX ORDER — TIRZEPATIDE 10 MG/.5ML
INJECTION, SOLUTION SUBCUTANEOUS
COMMUNITY
Start: 2023-08-03

## 2023-08-11 RX ORDER — DOXYCYCLINE HYCLATE 50 MG/1
1 CAPSULE, GELATIN COATED ORAL
Qty: 90 TABLET | Refills: 1 | Status: SHIPPED | OUTPATIENT
Start: 2023-08-11

## 2023-08-11 RX ORDER — INSULIN GLARGINE 300 U/ML
INJECTION, SOLUTION SUBCUTANEOUS
COMMUNITY
Start: 2023-05-19

## 2023-08-11 ASSESSMENT — ENCOUNTER SYMPTOMS
GASTROINTESTINAL NEGATIVE: 1
BACK PAIN: 1

## 2023-08-11 NOTE — PROGRESS NOTES
Chief Complaint   Patient presents with    Back Pain     Patient has had this before when she was pregnant.

## 2024-02-13 ENCOUNTER — TELEPHONE (OUTPATIENT)
Age: 54
End: 2024-02-13

## 2024-02-21 DIAGNOSIS — D50.9 IRON DEFICIENCY ANEMIA, UNSPECIFIED IRON DEFICIENCY ANEMIA TYPE: ICD-10-CM

## 2024-02-21 RX ORDER — FERROUS GLUCONATE 324(38)MG
TABLET ORAL
Qty: 90 TABLET | Refills: 1 | Status: SHIPPED | OUTPATIENT
Start: 2024-02-21

## 2024-02-26 DIAGNOSIS — M54.41 CHRONIC BILATERAL LOW BACK PAIN WITH BILATERAL SCIATICA: ICD-10-CM

## 2024-02-26 DIAGNOSIS — G89.29 CHRONIC BILATERAL LOW BACK PAIN WITH BILATERAL SCIATICA: ICD-10-CM

## 2024-02-26 DIAGNOSIS — G43.809 OTHER MIGRAINE WITHOUT STATUS MIGRAINOSUS, NOT INTRACTABLE: ICD-10-CM

## 2024-02-26 DIAGNOSIS — M79.671 FOOT PAIN, BILATERAL: ICD-10-CM

## 2024-02-26 DIAGNOSIS — M54.42 CHRONIC BILATERAL LOW BACK PAIN WITH BILATERAL SCIATICA: ICD-10-CM

## 2024-02-26 DIAGNOSIS — M79.672 FOOT PAIN, BILATERAL: ICD-10-CM

## 2024-02-26 RX ORDER — TOPIRAMATE 50 MG/1
50 TABLET, FILM COATED ORAL 2 TIMES DAILY
Qty: 120 TABLET | Refills: 1 | Status: SHIPPED | OUTPATIENT
Start: 2024-02-26

## 2024-02-26 RX ORDER — GABAPENTIN 300 MG/1
CAPSULE ORAL
Qty: 180 CAPSULE | Refills: 1 | Status: SHIPPED | OUTPATIENT
Start: 2024-02-26 | End: 2024-05-27

## 2024-02-27 DIAGNOSIS — D50.9 IRON DEFICIENCY ANEMIA, UNSPECIFIED IRON DEFICIENCY ANEMIA TYPE: ICD-10-CM

## 2024-02-27 RX ORDER — FERROUS GLUCONATE 324(38)MG
TABLET ORAL
Qty: 90 TABLET | Refills: 1 | Status: SHIPPED | OUTPATIENT
Start: 2024-02-27

## 2024-02-27 NOTE — TELEPHONE ENCOUNTER
[facility] [address] [phone] -  [phone]        ferrous gluconate (FERGON) 324 (38 Fe) MG tablet       Lov: 2/9/24  Nov: 3/7/24

## 2024-04-05 ENCOUNTER — OFFICE VISIT (OUTPATIENT)
Age: 54
End: 2024-04-05
Payer: COMMERCIAL

## 2024-04-05 VITALS
HEIGHT: 66 IN | WEIGHT: 244.6 LBS | DIASTOLIC BLOOD PRESSURE: 70 MMHG | BODY MASS INDEX: 39.31 KG/M2 | SYSTOLIC BLOOD PRESSURE: 120 MMHG | TEMPERATURE: 98.3 F | HEART RATE: 79 BPM | OXYGEN SATURATION: 98 % | RESPIRATION RATE: 18 BRPM

## 2024-04-05 DIAGNOSIS — Z13.220 LIPID SCREENING: ICD-10-CM

## 2024-04-05 DIAGNOSIS — E55.9 VITAMIN D DEFICIENCY: ICD-10-CM

## 2024-04-05 DIAGNOSIS — I10 ESSENTIAL (PRIMARY) HYPERTENSION: ICD-10-CM

## 2024-04-05 DIAGNOSIS — E66.9 OBESITY (BMI 30-39.9): ICD-10-CM

## 2024-04-05 DIAGNOSIS — E11.42 TYPE 2 DIABETES MELLITUS WITH DIABETIC POLYNEUROPATHY, UNSPECIFIED WHETHER LONG TERM INSULIN USE (HCC): Primary | ICD-10-CM

## 2024-04-05 DIAGNOSIS — E11.42 TYPE 2 DIABETES MELLITUS WITH DIABETIC POLYNEUROPATHY, UNSPECIFIED WHETHER LONG TERM INSULIN USE (HCC): ICD-10-CM

## 2024-04-05 LAB
BASOPHILS # BLD AUTO: 0 X10E3/UL (ref 0–0.2)
BASOPHILS NFR BLD AUTO: 0 %
EOSINOPHIL # BLD AUTO: 0.1 X10E3/UL (ref 0–0.4)
EOSINOPHIL NFR BLD AUTO: 1 %
ERYTHROCYTE [DISTWIDTH] IN BLOOD BY AUTOMATED COUNT: 13.2 % (ref 11.7–15.4)
HCT VFR BLD AUTO: 39.3 % (ref 34–46.6)
HGB BLD-MCNC: 13.1 G/DL (ref 11.1–15.9)
IMM GRANULOCYTES # BLD AUTO: 0 X10E3/UL (ref 0–0.1)
IMM GRANULOCYTES NFR BLD AUTO: 0 %
LYMPHOCYTES # BLD AUTO: 3.4 X10E3/UL (ref 0.7–3.1)
LYMPHOCYTES NFR BLD AUTO: 34 %
MCH RBC QN AUTO: 30.3 PG (ref 26.6–33)
MCHC RBC AUTO-ENTMCNC: 33.3 G/DL (ref 31.5–35.7)
MCV RBC AUTO: 91 FL (ref 79–97)
MONOCYTES # BLD AUTO: 0.5 X10E3/UL (ref 0.1–0.9)
MONOCYTES NFR BLD AUTO: 5 %
NEUTROPHILS # BLD AUTO: 5.9 X10E3/UL (ref 1.4–7)
NEUTROPHILS NFR BLD AUTO: 60 %
PLATELET # BLD AUTO: 366 X10E3/UL (ref 150–450)
RBC # BLD AUTO: 4.33 X10E6/UL (ref 3.77–5.28)
WBC # BLD AUTO: 9.9 X10E3/UL (ref 3.4–10.8)

## 2024-04-05 PROCEDURE — 3074F SYST BP LT 130 MM HG: CPT | Performed by: INTERNAL MEDICINE

## 2024-04-05 PROCEDURE — 3078F DIAST BP <80 MM HG: CPT | Performed by: INTERNAL MEDICINE

## 2024-04-05 PROCEDURE — 99214 OFFICE O/P EST MOD 30 MIN: CPT | Performed by: INTERNAL MEDICINE

## 2024-04-05 RX ORDER — INSULIN LISPRO 100 [IU]/ML
6 INJECTION, SOLUTION INTRAVENOUS; SUBCUTANEOUS 3 TIMES DAILY
COMMUNITY

## 2024-04-05 RX ORDER — SEMAGLUTIDE 2.68 MG/ML
2 INJECTION, SOLUTION SUBCUTANEOUS
COMMUNITY

## 2024-04-05 SDOH — ECONOMIC STABILITY: FOOD INSECURITY: WITHIN THE PAST 12 MONTHS, YOU WORRIED THAT YOUR FOOD WOULD RUN OUT BEFORE YOU GOT MONEY TO BUY MORE.: NEVER TRUE

## 2024-04-05 SDOH — ECONOMIC STABILITY: INCOME INSECURITY: HOW HARD IS IT FOR YOU TO PAY FOR THE VERY BASICS LIKE FOOD, HOUSING, MEDICAL CARE, AND HEATING?: NOT HARD AT ALL

## 2024-04-05 SDOH — ECONOMIC STABILITY: HOUSING INSECURITY
IN THE LAST 12 MONTHS, WAS THERE A TIME WHEN YOU DID NOT HAVE A STEADY PLACE TO SLEEP OR SLEPT IN A SHELTER (INCLUDING NOW)?: NO

## 2024-04-05 SDOH — ECONOMIC STABILITY: FOOD INSECURITY: WITHIN THE PAST 12 MONTHS, THE FOOD YOU BOUGHT JUST DIDN'T LAST AND YOU DIDN'T HAVE MONEY TO GET MORE.: NEVER TRUE

## 2024-04-05 ASSESSMENT — PATIENT HEALTH QUESTIONNAIRE - PHQ9
SUM OF ALL RESPONSES TO PHQ QUESTIONS 1-9: 0
2. FEELING DOWN, DEPRESSED OR HOPELESS: NOT AT ALL
1. LITTLE INTEREST OR PLEASURE IN DOING THINGS: NOT AT ALL
SUM OF ALL RESPONSES TO PHQ QUESTIONS 1-9: 0
SUM OF ALL RESPONSES TO PHQ9 QUESTIONS 1 & 2: 0

## 2024-04-05 ASSESSMENT — ANXIETY QUESTIONNAIRES
2. NOT BEING ABLE TO STOP OR CONTROL WORRYING: NOT AT ALL
7. FEELING AFRAID AS IF SOMETHING AWFUL MIGHT HAPPEN: NOT AT ALL
6. BECOMING EASILY ANNOYED OR IRRITABLE: NOT AT ALL
GAD7 TOTAL SCORE: 0
4. TROUBLE RELAXING: NOT AT ALL
3. WORRYING TOO MUCH ABOUT DIFFERENT THINGS: NOT AT ALL
1. FEELING NERVOUS, ANXIOUS, OR ON EDGE: NOT AT ALL
5. BEING SO RESTLESS THAT IT IS HARD TO SIT STILL: NOT AT ALL
IF YOU CHECKED OFF ANY PROBLEMS ON THIS QUESTIONNAIRE, HOW DIFFICULT HAVE THESE PROBLEMS MADE IT FOR YOU TO DO YOUR WORK, TAKE CARE OF THINGS AT HOME, OR GET ALONG WITH OTHER PEOPLE: NOT DIFFICULT AT ALL

## 2024-04-05 ASSESSMENT — ENCOUNTER SYMPTOMS
RESPIRATORY NEGATIVE: 1
GASTROINTESTINAL NEGATIVE: 1

## 2024-04-05 NOTE — PROGRESS NOTES
Chief Complaint   Patient presents with    Migraine    Diabetes    Dizziness     \"Have you been to the ER, urgent care clinic since your last visit?  Hospitalized since your last visit?\"    NO    “Have you seen or consulted any other health care providers outside of [facility] since your last visit?”    NO     “Have you had a pap smear?”    YES - Where: [facility] G[facility]rez Nurse/CMA to request most recent records if not in the chart    No cervical cancer screening on file         “Have you had a colorectal cancer screening such as a colonoscopy/FIT/Cologuard?    NO    No colonoscopy on file  No cologuard on file  No FIT/FOBT on file   No flexible sigmoidoscopy on file         Click Here for Release of Records Request

## 2024-04-05 NOTE — PROGRESS NOTES
Subjective    Parul Schroeder is a 53 y.o. female who presents today for the following: patient was seen for a follow up.     Reports that she is still being followed by endocrine for her DM. She was on Mounjaro but has now been changed to Ozempic due to supply. Reports that she has dizziness at times. Also is going to see neurology for neuropathy.   Makes note that her blood sugars do drop below 60 often. Hold insulin at times due to this.     Reports that she increased her fluids after she was having increase in muscle spasms. This did help some.     Having some hot flashes and difficulty with losing weight.     She need follow up blood work  Chief Complaint   Patient presents with    Migraine    Diabetes    Dizziness    Referral - General     Colonoscopy             PMH/PSH/Allergies/Social History/medication list and most recent studies reviewed with patient.     reports that she has never smoked. She has never used smokeless tobacco.    reports no history of alcohol use.     Vitals:    04/05/24 1040   BP: 120/70   Pulse: 79   Resp: 18   Temp: 98.3 °F (36.8 °C)   SpO2: 98%     Body mass index is 39.48 kg/m².      4/5/2024    10:40 AM 8/11/2023     3:22 PM 10/17/2022     5:22 PM 9/8/2022     7:00 AM   Weight Metrics   Weight 244 lb 9.6 oz 224 lb 222 lb 222 lb   BMI (Calculated) 39.6 kg/m2 36.2 kg/m2 0 kg/m2 35.9 kg/m2       Past Medical History:   Diagnosis Date    Benign pancreatic islet cell tumors     2001    Diabetes (HCC)     Migraine     Thyroid disease     hyperthyroid       Allergies   Allergen Reactions    Bee Venom Swelling    Fish-Derived Products Hives    Shellfish Allergy Hives    Terbinafine Hcl      Other reaction(s): Unknown (comments)    Lisinopril Hives and Rash     hives and swollen lip.  Other reaction(s): Rash, swelling  Other reaction(s): Rash, swelling    Terbinafine Rash     Other reaction(s): Other (see comments)  Other reaction(s): Red Spots        Current Outpatient Medications on

## 2024-04-06 LAB
25(OH)D3+25(OH)D2 SERPL-MCNC: 37.9 NG/ML (ref 30–100)
ALBUMIN SERPL-MCNC: 4.1 G/DL (ref 3.8–4.9)
ALBUMIN/CREAT UR: <8 MG/G CREAT (ref 0–29)
ALBUMIN/GLOB SERPL: 1.2 {RATIO} (ref 1.2–2.2)
ALP SERPL-CCNC: 99 IU/L (ref 44–121)
ALT SERPL-CCNC: 45 IU/L (ref 0–32)
AST SERPL-CCNC: 42 IU/L (ref 0–40)
BILIRUB SERPL-MCNC: 0.3 MG/DL (ref 0–1.2)
BUN SERPL-MCNC: 15 MG/DL (ref 6–24)
BUN/CREAT SERPL: 18 (ref 9–23)
CALCIUM SERPL-MCNC: 9.3 MG/DL (ref 8.7–10.2)
CHLORIDE SERPL-SCNC: 103 MMOL/L (ref 96–106)
CHOLEST SERPL-MCNC: 170 MG/DL (ref 100–199)
CO2 SERPL-SCNC: 23 MMOL/L (ref 20–29)
CREAT SERPL-MCNC: 0.83 MG/DL (ref 0.57–1)
CREAT UR-MCNC: 36.4 MG/DL
EGFRCR SERPLBLD CKD-EPI 2021: 84 ML/MIN/1.73
GLOBULIN SER CALC-MCNC: 3.3 G/DL (ref 1.5–4.5)
GLUCOSE SERPL-MCNC: 217 MG/DL (ref 70–99)
HBA1C MFR BLD: 7.7 % (ref 4.8–5.6)
HDLC SERPL-MCNC: 64 MG/DL
IMP & REVIEW OF LAB RESULTS: NORMAL
LDLC SERPL CALC-MCNC: 92 MG/DL (ref 0–99)
Lab: NORMAL
MICROALBUMIN UR-MCNC: <3 UG/ML
POTASSIUM SERPL-SCNC: 4.3 MMOL/L (ref 3.5–5.2)
PROT SERPL-MCNC: 7.4 G/DL (ref 6–8.5)
SODIUM SERPL-SCNC: 136 MMOL/L (ref 134–144)
TRIGL SERPL-MCNC: 76 MG/DL (ref 0–149)
TSH SERPL DL<=0.005 MIU/L-ACNC: 2.19 UIU/ML (ref 0.45–4.5)
VLDLC SERPL CALC-MCNC: 14 MG/DL (ref 5–40)

## 2024-04-08 ENCOUNTER — TELEPHONE (OUTPATIENT)
Age: 54
End: 2024-04-08

## 2024-04-08 NOTE — TELEPHONE ENCOUNTER
Parul Schroeder was called with no answer, voicemail confirmed it was her. I have left results on VM if she has any questions she can call me back and number has been provided.     ----- Message from KING Chanel NP sent at 4/7/2024  3:59 PM EDT -----  Call patient      DM is controlled. Stop Humalog . Continue to monitor blood sugars for lows.     Liver levels are elevated. If drinking often or taking tylenol often, please stop. Repeat CMP in 1 month     All other labs stable

## 2024-04-18 DIAGNOSIS — M54.41 CHRONIC BILATERAL LOW BACK PAIN WITH BILATERAL SCIATICA: ICD-10-CM

## 2024-04-18 DIAGNOSIS — G89.29 CHRONIC BILATERAL LOW BACK PAIN WITH BILATERAL SCIATICA: ICD-10-CM

## 2024-04-18 DIAGNOSIS — M79.672 FOOT PAIN, BILATERAL: ICD-10-CM

## 2024-04-18 DIAGNOSIS — M54.42 CHRONIC BILATERAL LOW BACK PAIN WITH BILATERAL SCIATICA: ICD-10-CM

## 2024-04-18 DIAGNOSIS — M79.671 FOOT PAIN, BILATERAL: ICD-10-CM

## 2024-04-18 RX ORDER — GABAPENTIN 300 MG/1
CAPSULE ORAL
Qty: 180 CAPSULE | Refills: 1 | Status: SHIPPED | OUTPATIENT
Start: 2024-04-18 | End: 2024-07-18

## 2024-04-18 NOTE — TELEPHONE ENCOUNTER
[facility] - [place], [place] - [address] -  [phone] - F [phone]     gabapentin (NEURONTIN) 300 MG capsule     04/05/2024 08/23/2024

## 2024-05-03 DIAGNOSIS — G43.809 OTHER MIGRAINE WITHOUT STATUS MIGRAINOSUS, NOT INTRACTABLE: ICD-10-CM

## 2024-05-03 RX ORDER — TOPIRAMATE 50 MG/1
50 TABLET, FILM COATED ORAL 2 TIMES DAILY
Qty: 180 TABLET | Refills: 1 | Status: SHIPPED | OUTPATIENT
Start: 2024-05-03

## 2024-05-17 ENCOUNTER — HOSPITAL ENCOUNTER (OUTPATIENT)
Facility: HOSPITAL | Age: 54
Discharge: HOME | End: 2024-05-17
Payer: COMMERCIAL

## 2024-05-17 VITALS — HEIGHT: 65 IN | BODY MASS INDEX: 40.65 KG/M2 | WEIGHT: 244 LBS

## 2024-05-17 DIAGNOSIS — Z12.31 SCREENING MAMMOGRAM FOR HIGH-RISK PATIENT: ICD-10-CM

## 2024-05-17 PROCEDURE — 77063 BREAST TOMOSYNTHESIS BI: CPT

## 2024-05-20 ENCOUNTER — TELEPHONE (OUTPATIENT)
Age: 54
End: 2024-05-20

## 2024-05-20 NOTE — TELEPHONE ENCOUNTER
Called 02:35 pm   Lvm  Regarding labs      ----- Message from KING Vivas CNP sent at 5/19/2024  9:10 PM EDT -----  Stable mammogram

## 2024-05-20 NOTE — TELEPHONE ENCOUNTER
Patient called back 03:23 pm   Spoke with patient after verifying name and . Notified patient of lab results and recommendation from provider. Patient verbalized understanding and given a chance to ask questions.

## 2024-06-08 DIAGNOSIS — D50.9 IRON DEFICIENCY ANEMIA, UNSPECIFIED IRON DEFICIENCY ANEMIA TYPE: Primary | ICD-10-CM

## 2024-06-10 RX ORDER — FERROUS SULFATE 324(65)MG
TABLET, DELAYED RELEASE (ENTERIC COATED) ORAL
Qty: 90 TABLET | Refills: 0 | Status: SHIPPED | OUTPATIENT
Start: 2024-06-10

## 2024-07-12 ENCOUNTER — HOSPITAL ENCOUNTER (OUTPATIENT)
Facility: HOSPITAL | Age: 54
Discharge: HOME | End: 2024-07-12
Payer: COMMERCIAL

## 2024-07-12 ENCOUNTER — OFFICE VISIT (OUTPATIENT)
Age: 54
End: 2024-07-12
Payer: COMMERCIAL

## 2024-07-12 VITALS
BODY MASS INDEX: 40.02 KG/M2 | HEIGHT: 66 IN | HEART RATE: 80 BPM | OXYGEN SATURATION: 96 % | SYSTOLIC BLOOD PRESSURE: 102 MMHG | DIASTOLIC BLOOD PRESSURE: 60 MMHG | WEIGHT: 249 LBS

## 2024-07-12 DIAGNOSIS — S09.90XA INJURY OF HEAD, INITIAL ENCOUNTER: ICD-10-CM

## 2024-07-12 DIAGNOSIS — R07.81 RIB PAIN ON RIGHT SIDE: ICD-10-CM

## 2024-07-12 PROCEDURE — 71046 X-RAY EXAM CHEST 2 VIEWS: CPT

## 2024-07-12 PROCEDURE — 99213 OFFICE O/P EST LOW 20 MIN: CPT | Performed by: CLINICAL NURSE SPECIALIST

## 2024-07-12 PROCEDURE — 3078F DIAST BP <80 MM HG: CPT | Performed by: CLINICAL NURSE SPECIALIST

## 2024-07-12 PROCEDURE — 3074F SYST BP LT 130 MM HG: CPT | Performed by: CLINICAL NURSE SPECIALIST

## 2024-07-12 RX ORDER — ACETAMINOPHEN 500 MG
1000 TABLET ORAL
COMMUNITY
Start: 2024-07-06 | End: 2024-07-12

## 2024-07-12 RX ORDER — INSULIN LISPRO-AABC 100 [IU]/ML
INJECTION, SOLUTION SUBCUTANEOUS
COMMUNITY
Start: 2024-06-07

## 2024-07-12 RX ORDER — DICLOFENAC SODIUM 75 MG/1
75 TABLET, DELAYED RELEASE ORAL 2 TIMES DAILY WITH MEALS
COMMUNITY
Start: 2024-06-17

## 2024-07-12 RX ORDER — TIRZEPATIDE 15 MG/.5ML
INJECTION, SOLUTION SUBCUTANEOUS
COMMUNITY
Start: 2024-05-20

## 2024-07-12 RX ORDER — NAPROXEN 250 MG/1
250 TABLET ORAL
COMMUNITY
Start: 2024-07-06

## 2024-07-12 ASSESSMENT — ENCOUNTER SYMPTOMS: SHORTNESS OF BREATH: 0

## 2024-07-12 NOTE — PROGRESS NOTES
Chief Complaint   Patient presents with    Flank Pain    Fall     Pt states she had a fall on 7.6.24 hit her head and was taken to the hospital. Ever since she has been experience pain on her right side underneath the breast area. She states it was both sides but now its only on the right. It is painful to lay down, bend, coughing, and sneeze.    \"Have you been to the ER, urgent care clinic since your last visit?  Hospitalized since your last visit?\"    YES - When: approximately 1  weeks ago.  Where and Why: [facility].    “Have you seen or consulted any other health care providers outside of [facility] since your last visit?”    NO     “Have you had a pap smear?”    Yes - Dr Rousseau    No cervical cancer screening on file         “Have you had a colorectal cancer screening such as a colonoscopy/FIT/Cologuard?    NO - has a scheduled one for october    No colonoscopy on file  No cologuard on file  No FIT/FOBT on file   No flexible sigmoidoscopy on file         Click Here for Release of Records Request

## 2024-07-12 NOTE — PROGRESS NOTES
Parul Schroeder (:  1970) is a 54 y.o. female,Established patient, here for evaluation of the following chief complaint(s):  Flank Pain and Fall      Assessment & Plan   1. Rib pain on right side  -     XR CHEST (2 VIEWS); Future  2. Injury of head, initial encounter  -     CT HEAD WO CONTRAST; Future    Discussed conservative management including ice and heat.   Encouraged to utilize naprosyn as needed, advised to DC tylenol given elevated LFTs.  Reassured that headache likely r/t concussion, discussed indication for rest.   Will follow up pending test results and discuss any additional plan of care.   Encouraged to call with any questions or concerns in the interim.     Return if symptoms worsen or fail to improve.       Adolph Teran presents for a problem visit. Recently sustained a fall and hit her head.   She was in the grocery store when this happened, states that she fell forward.   Believes that her abdomen hit the floor and her head hit the wall. Imaging in ED normal.   In recent days, started to have pain to her right side below her breast.   States that the pain is worse when she coughs or takes a deep breath.   Yesterday, her mother in law accidentally dropped a metal fan on her head.   States that prior to this her head pain had lessened but she is now having headaches again.   Admits that her head feels full or as if it has pressure in it.   She denies any visual disturbance, weakness, nausea, vomiting, changes to speech/gait.           Review of Systems   Constitutional:  Negative for fever.   Respiratory:  Negative for shortness of breath.    Cardiovascular:  Negative for chest pain.   Neurological:  Negative for headaches.         Current Outpatient Medications on File Prior to Visit   Medication Sig Dispense Refill    naproxen (NAPROSYN) 250 MG tablet 1 tablet      LYUMJEV KWIKPEN 100 UNIT/ML SOPN       MOUNJARO 15 MG/0.5ML SOPN SC injection INJECT 1 SYRINGE SUBCUTANEOUSLY

## 2024-07-15 ENCOUNTER — TELEPHONE (OUTPATIENT)
Age: 54
End: 2024-07-15

## 2024-07-15 NOTE — TELEPHONE ENCOUNTER
Parul Schroeder was called and verbalized understanding on note below.     ----- Message from KING Vivas - CNP sent at 7/15/2024 11:32 AM EDT -----  Stable chest xray

## 2024-07-26 ENCOUNTER — HOSPITAL ENCOUNTER (OUTPATIENT)
Facility: HOSPITAL | Age: 54
Discharge: HOME | End: 2024-07-26
Payer: COMMERCIAL

## 2024-07-26 DIAGNOSIS — S09.90XA INJURY OF HEAD, INITIAL ENCOUNTER: ICD-10-CM

## 2024-07-26 PROCEDURE — 70450 CT HEAD/BRAIN W/O DYE: CPT

## 2024-07-30 ENCOUNTER — OFFICE VISIT (OUTPATIENT)
Age: 54
End: 2024-07-30

## 2024-07-30 VITALS
DIASTOLIC BLOOD PRESSURE: 85 MMHG | SYSTOLIC BLOOD PRESSURE: 128 MMHG | WEIGHT: 255 LBS | HEART RATE: 90 BPM | BODY MASS INDEX: 41.16 KG/M2 | OXYGEN SATURATION: 99 % | TEMPERATURE: 97.9 F

## 2024-07-30 DIAGNOSIS — H61.21 IMPACTED CERUMEN OF RIGHT EAR: Primary | ICD-10-CM

## 2024-07-30 NOTE — PROGRESS NOTES
Parul Schroeder (:  1970) is a 54 y.o. female,Established patient, here for evaluation of the following chief complaint(s):  Otalgia (Pt present for Right ear clog. Pt stated tat sx started last night )      Assessment & Plan :  Visit Diagnoses and Associated Orders       Impacted cerumen of right ear    -  Primary    44531 - [place] REMOVE IMPACTED EAR WAX [00369 CPT(R)]      NEW - Rommel Schaeffer MD, Otolaryngology, [place] [[id number] Custom]                 Patient was seen today for impacted cerumen of the right ear  Debrox was applied and ear irrigated  Small amount of cerumen removed, but there does still appear to be a significant amount of wax in the ear  Patient is not tolerating procedure well as it is causing her some pain  I would like for her to get some Debrox, available over-the-counter and apply several drops to the right ear once per day  It would be best to do this about 5 to 10 minutes before showering, and then remaining on your side for about 5 to 10 minutes and allowing the drops to soak  Afterwards you may let the warm water from the shower irrigate the ear and remove the earwax softening drops  Please follow-up with the ENT, I have given you a referral  Monitor your symptoms carefully, if you develop severe ear pain, fevers, chills, or other severe symptoms, please return for reevaluation       Subjective :    Otalgia          54 y.o. female presents with symptoms of clogged sensation in her right ear which has persisted for the last several days but acutely worsened last night.  She states that she has needed ear irrigation in the past, will typically get this about once per year but was able to skip last year.  She denies significant ear pain but does report some mild throbbing in the ear as well as some mild tinnitus.  Primarily she is experiencing a full feeling in the ear with some muffled hearing.  Denies any recent fevers, chills, body aches or fatigue.  No nasal congestion, runny

## 2024-07-30 NOTE — PATIENT INSTRUCTIONS
Patient was seen today for impacted cerumen of the right ear  Debrox was applied and ear irrigated  Small amount of cerumen removed, but there does still appear to be a significant amount of wax in the ear  Patient is not tolerating procedure well as it is causing her some pain  I would like for her to get some Debrox, available over-the-counter and apply several drops to the right ear once per day  It would be best to do this about 5 to 10 minutes before showering, and then remaining on your side for about 5 to 10 minutes and allowing the drops to soak  Afterwards you may let the warm water from the shower irrigate the ear and remove the earwax softening drops  Please follow-up with the ENT, I have given you a referral  Monitor your symptoms carefully, if you develop severe ear pain, fevers, chills, or other severe symptoms, please return for reevaluation

## 2024-08-01 ENCOUNTER — TELEPHONE (OUTPATIENT)
Age: 54
End: 2024-08-01

## 2024-08-01 NOTE — TELEPHONE ENCOUNTER
----- Message from KING Vivas CNP sent at 7/31/2024 10:29 AM EDT -----  Normal CT scan of her head.

## 2024-09-13 ENCOUNTER — OFFICE VISIT (OUTPATIENT)
Age: 54
End: 2024-09-13
Payer: COMMERCIAL

## 2024-09-13 VITALS
TEMPERATURE: 98 F | RESPIRATION RATE: 18 BRPM | BODY MASS INDEX: 42.43 KG/M2 | DIASTOLIC BLOOD PRESSURE: 66 MMHG | HEART RATE: 71 BPM | HEIGHT: 65 IN | OXYGEN SATURATION: 98 % | SYSTOLIC BLOOD PRESSURE: 118 MMHG

## 2024-09-13 DIAGNOSIS — I10 ESSENTIAL (PRIMARY) HYPERTENSION: ICD-10-CM

## 2024-09-13 DIAGNOSIS — E66.01 MORBID OBESITY WITH BMI OF 40.0-44.9, ADULT (HCC): ICD-10-CM

## 2024-09-13 DIAGNOSIS — G43.809 OTHER MIGRAINE WITHOUT STATUS MIGRAINOSUS, NOT INTRACTABLE: ICD-10-CM

## 2024-09-13 DIAGNOSIS — E11.42 TYPE 2 DIABETES MELLITUS WITH DIABETIC POLYNEUROPATHY, UNSPECIFIED WHETHER LONG TERM INSULIN USE (HCC): Primary | ICD-10-CM

## 2024-09-13 PROCEDURE — 99214 OFFICE O/P EST MOD 30 MIN: CPT | Performed by: INTERNAL MEDICINE

## 2024-09-13 PROCEDURE — 3051F HG A1C>EQUAL 7.0%<8.0%: CPT | Performed by: INTERNAL MEDICINE

## 2024-09-13 PROCEDURE — 3078F DIAST BP <80 MM HG: CPT | Performed by: INTERNAL MEDICINE

## 2024-09-13 PROCEDURE — 3074F SYST BP LT 130 MM HG: CPT | Performed by: INTERNAL MEDICINE

## 2024-09-13 RX ORDER — ACYCLOVIR 400 MG/1
TABLET ORAL
COMMUNITY
Start: 2024-08-23

## 2024-09-13 RX ORDER — CARVEDILOL 25 MG/1
1 TABLET, FILM COATED ORAL DAILY
COMMUNITY
Start: 2024-09-03

## 2024-09-13 RX ORDER — BLOOD-GLUCOSE METER
EACH MISCELLANEOUS
COMMUNITY
Start: 2024-09-03

## 2024-09-13 ASSESSMENT — ENCOUNTER SYMPTOMS
GASTROINTESTINAL NEGATIVE: 1
RESPIRATORY NEGATIVE: 1

## 2024-09-15 DIAGNOSIS — D50.9 IRON DEFICIENCY ANEMIA, UNSPECIFIED IRON DEFICIENCY ANEMIA TYPE: ICD-10-CM

## 2024-09-16 RX ORDER — FERROUS SULFATE 324(65)MG
TABLET, DELAYED RELEASE (ENTERIC COATED) ORAL
Qty: 90 TABLET | Refills: 0 | Status: SHIPPED | OUTPATIENT
Start: 2024-09-16

## 2024-10-25 ENCOUNTER — TELEPHONE (OUTPATIENT)
Age: 54
End: 2024-10-25

## 2024-10-25 DIAGNOSIS — G43.809 OTHER MIGRAINE WITHOUT STATUS MIGRAINOSUS, NOT INTRACTABLE: ICD-10-CM

## 2024-10-25 RX ORDER — TOPIRAMATE 50 MG/1
50 TABLET, FILM COATED ORAL 2 TIMES DAILY
Qty: 180 TABLET | Refills: 1 | Status: SHIPPED | OUTPATIENT
Start: 2024-10-25

## 2024-11-22 LAB — HBA1C MFR BLD HPLC: 8.4 %

## 2024-12-16 DIAGNOSIS — D50.9 IRON DEFICIENCY ANEMIA, UNSPECIFIED IRON DEFICIENCY ANEMIA TYPE: ICD-10-CM

## 2024-12-17 RX ORDER — FERROUS SULFATE 324(65)MG
TABLET, DELAYED RELEASE (ENTERIC COATED) ORAL
Qty: 90 TABLET | Refills: 0 | Status: SHIPPED | OUTPATIENT
Start: 2024-12-17

## 2024-12-29 ENCOUNTER — TELEPHONE (OUTPATIENT)
Age: 54
End: 2024-12-29

## 2024-12-29 DIAGNOSIS — D50.9 IRON DEFICIENCY ANEMIA, UNSPECIFIED IRON DEFICIENCY ANEMIA TYPE: ICD-10-CM

## 2024-12-30 RX ORDER — FERROUS SULFATE 324(65)MG
TABLET, DELAYED RELEASE (ENTERIC COATED) ORAL
Qty: 30 TABLET | Refills: 1 | Status: SHIPPED | OUTPATIENT
Start: 2024-12-30 | End: 2025-01-29

## 2024-12-30 NOTE — TELEPHONE ENCOUNTER
Last appt 9/13/2024      Next Apt:     Future Appointments   Date Time Provider Department Center   1/17/2025  1:00 PM Adrian Mixon MD [facility]

## 2025-01-17 ENCOUNTER — OFFICE VISIT (OUTPATIENT)
Age: 55
End: 2025-01-17
Payer: COMMERCIAL

## 2025-01-17 VITALS
HEIGHT: 65 IN | BODY MASS INDEX: 42.65 KG/M2 | OXYGEN SATURATION: 99 % | DIASTOLIC BLOOD PRESSURE: 86 MMHG | SYSTOLIC BLOOD PRESSURE: 132 MMHG | WEIGHT: 256 LBS | HEART RATE: 92 BPM

## 2025-01-17 DIAGNOSIS — R20.0 NUMBNESS AND TINGLING IN BOTH HANDS: ICD-10-CM

## 2025-01-17 DIAGNOSIS — R20.2 NUMBNESS AND TINGLING IN BOTH HANDS: ICD-10-CM

## 2025-01-17 DIAGNOSIS — E11.42 DIABETIC POLYNEUROPATHY ASSOCIATED WITH TYPE 2 DIABETES MELLITUS (HCC): Primary | ICD-10-CM

## 2025-01-17 DIAGNOSIS — G43.709 CHRONIC MIGRAINE WITHOUT AURA, NOT INTRACTABLE, WITHOUT STATUS MIGRAINOSUS: ICD-10-CM

## 2025-01-17 PROCEDURE — 99204 OFFICE O/P NEW MOD 45 MIN: CPT | Performed by: PSYCHIATRY & NEUROLOGY

## 2025-01-17 PROCEDURE — 3075F SYST BP GE 130 - 139MM HG: CPT | Performed by: PSYCHIATRY & NEUROLOGY

## 2025-01-17 PROCEDURE — 3079F DIAST BP 80-89 MM HG: CPT | Performed by: PSYCHIATRY & NEUROLOGY

## 2025-01-17 RX ORDER — SEMAGLUTIDE 1.34 MG/ML
INJECTION, SOLUTION SUBCUTANEOUS
COMMUNITY
Start: 2024-11-22

## 2025-01-17 RX ORDER — PREGABALIN 25 MG/1
25 CAPSULE ORAL 3 TIMES DAILY PRN
Qty: 90 CAPSULE | Refills: 0 | Status: SHIPPED | OUTPATIENT
Start: 2025-01-17 | End: 2025-02-16

## 2025-01-17 NOTE — PROGRESS NOTES
Chief Complaint   Patient presents with    Neurologic Problem     Numbness and tingling in both feet     Vitals:    01/17/25 1318   BP: 132/86   Pulse: 92   SpO2: 99%             
negative  Endocrine ROS: negative  Respiratory ROS: no cough, shortness of breath, or wheezing  Cardiovascular ROS: no chest pain or dyspnea on exertion  Gastrointestinal ROS: no abdominal pain, change in bowel habits, or black or bloody stools  Genito-Urinary ROS: no dysuria, trouble voiding, or hematuria  Musculoskeletal ROS: negative  Dermatological ROS: negative      PHYSICAL EXAMINATION:    Vitals:    01/17/25 1318   BP: 132/86   Pulse: 92   SpO2: 99%     General:  Well groomed individual in no acute distress.    Neck: Supple, nontender, no bruits, no pain with resistance to active range of motion.    Heart: Regular rate and rhythm.  Normal S1S2.  Lungs:  Equal chest expansion, no cough, no wheeze  Musculoskeletal:  Extremities revealed no edema and had full range of motion of joints.    Psych:  Good mood and bright affect    NEUROLOGICAL EXAMINATION:     Mental Status:   Alert and oriented to person, place, and time. Attention span and concentration are normal. Speech is fluent.      Cranial Nerves:    II, III, IV, VI:  Visual acuity grossly intact. Visual fields are normal.    Pupils are equal, round, and reactive to light.    Extra-ocular movements are full and fluid.  Fundoscopic exam was benign, no ptosis or nystagmus.   V-XII: Hearing is grossly intact.  Facial features are symmetric, with normal sensation and strength.  The palate rises symmetrically and the tongue protrudes midline.  Sternocleidomastoids 5/5.      Motor Examination: Normal tone, bulk, and strength. 5/5 muscle strength throughout.  No cogwheel rigidity or clonus present.      Sensory exam: Mildly impaired pinprick over the toes with distal to proximal gradient.  Intact Giovani sense, proprioception and vibration sense.    Coordination:  Finger to nose and rapid arm movement testing was normal.   No resting or intention tremor    Gait and Station:  Steady including tandem walking.  Normal arm swing.  No Rhomberg or pronator drift.   No

## 2025-01-31 ENCOUNTER — TELEPHONE (OUTPATIENT)
Age: 55
End: 2025-01-31

## 2025-01-31 DIAGNOSIS — E11.42 DIABETIC POLYNEUROPATHY ASSOCIATED WITH TYPE 2 DIABETES MELLITUS (HCC): Primary | ICD-10-CM

## 2025-01-31 NOTE — TELEPHONE ENCOUNTER
Please call patient back to discuss a test she believes Dr. Mixon ordered to check for neuropathy.    PSR does not see an order for this test.    Patient can be reached at [phone].

## 2025-02-04 DIAGNOSIS — E11.42 DIABETIC POLYNEUROPATHY ASSOCIATED WITH TYPE 2 DIABETES MELLITUS (HCC): ICD-10-CM

## 2025-02-04 RX ORDER — PREGABALIN 25 MG/1
25 CAPSULE ORAL 3 TIMES DAILY PRN
Qty: 90 CAPSULE | Refills: 0 | Status: SHIPPED | OUTPATIENT
Start: 2025-02-04 | End: 2025-03-06

## 2025-02-04 NOTE — TELEPHONE ENCOUNTER
LOV: 01/17/25  Last refill: 01/17/25 with 0 refill  Next visit: pending, will put on script to make 1 yr follow up for future refills

## 2025-03-07 DIAGNOSIS — D50.9 IRON DEFICIENCY ANEMIA, UNSPECIFIED IRON DEFICIENCY ANEMIA TYPE: ICD-10-CM

## 2025-03-07 RX ORDER — FERROUS SULFATE 324(65)MG
TABLET, DELAYED RELEASE (ENTERIC COATED) ORAL
Qty: 30 TABLET | Refills: 1 | Status: SHIPPED | OUTPATIENT
Start: 2025-03-07 | End: 2025-04-06

## 2025-03-10 ENCOUNTER — PROCEDURE VISIT (OUTPATIENT)
Age: 55
End: 2025-03-10
Payer: COMMERCIAL

## 2025-03-10 DIAGNOSIS — R20.0 NUMBNESS AND TINGLING OF BOTH FEET: ICD-10-CM

## 2025-03-10 DIAGNOSIS — R20.2 NUMBNESS AND TINGLING OF BOTH FEET: ICD-10-CM

## 2025-03-10 DIAGNOSIS — G56.03 BILATERAL CARPAL TUNNEL SYNDROME: Primary | ICD-10-CM

## 2025-03-10 PROCEDURE — 95886 MUSC TEST DONE W/N TEST COMP: CPT | Performed by: PSYCHIATRY & NEUROLOGY

## 2025-03-10 PROCEDURE — 95913 NRV CNDJ TEST 13/> STUDIES: CPT | Performed by: PSYCHIATRY & NEUROLOGY

## 2025-03-10 NOTE — PROGRESS NOTES
[facility]  [facility]  [address] [place] [address], [address]  [place] [id number]  Phone [phone] Fax [phone]     Test Date:  3/10/2025    Patient: Parul Schroeder : 1970 Physician: Adrian Mixon MD   Sex: Female Height: 5' 5\" Ref Phys: Adrian Mixon MD   ID#: [id number] Weight: 256 lbs. Technician: Chris Escalante LPN     Patient Complaints:  Numbness in hands and feet    Patient History / Exam:  54-year-old with diabetes who is being evaluated for numbness in both hands left more than right.  She also gets numbness intermittently in both feet    NCV & EMG Findings:  Evaluation of the left median sensory and the right median sensory nerves showed prolonged distal peak latency (L4.2, R4.1 ms) and decreased conduction velocity (Wrist-2nd Digit, L33, R34 m/s).  The left median/ulnar (palm) comparison and the right median/ulnar (palm) comparison nerves showed abnormal peak latency difference (Median Palm-Ulnar Palm, L1.0, R0.6 ms).  All remaining nerves (as indicated in the following tables) were within normal limits.  All left vs. right side differences were within normal limits.      All F Wave latencies were within normal limits.  All F Wave left vs. right side latency differences were within normal limits.      All examined muscles (as indicated in the following table) showed no evidence of electrical instability.      Impression:  No evidence to suggest a large fiber peripheral neuropathy.  Small fiber neuropathy is not excluded.  Bilateral entrapment median mononeuropathy i.e. carpal tunnel syndrome.  This is mild to moderate on the left and mild on the right.  No denervation seen in the left abductor pollicis brevis  No evidence of a cervical or lumbosacral colopathy in the left      Recommendations:  Referral to hand surgery for evaluation regarding carpal tunnel release    ___________________________  Adrian Mixon MD        Nerve

## 2025-03-11 DIAGNOSIS — E11.42 DIABETIC POLYNEUROPATHY ASSOCIATED WITH TYPE 2 DIABETES MELLITUS (HCC): ICD-10-CM

## 2025-03-12 RX ORDER — PREGABALIN 25 MG/1
25 CAPSULE ORAL 3 TIMES DAILY PRN
Qty: 90 CAPSULE | Refills: 0 | OUTPATIENT
Start: 2025-03-12 | End: 2025-04-11

## 2025-03-14 ENCOUNTER — TELEPHONE (OUTPATIENT)
Age: 55
End: 2025-03-14

## 2025-03-17 DIAGNOSIS — E11.42 DIABETIC POLYNEUROPATHY ASSOCIATED WITH TYPE 2 DIABETES MELLITUS: ICD-10-CM

## 2025-03-17 RX ORDER — PREGABALIN 25 MG/1
25 CAPSULE ORAL 3 TIMES DAILY PRN
Qty: 90 CAPSULE | Refills: 0 | Status: SHIPPED | OUTPATIENT
Start: 2025-03-17 | End: 2025-06-15

## 2025-03-28 ENCOUNTER — TRANSCRIBE ORDERS (OUTPATIENT)
Facility: HOSPITAL | Age: 55
End: 2025-03-28

## 2025-03-28 DIAGNOSIS — Z12.31 SCREENING MAMMOGRAM FOR BREAST CANCER: Primary | ICD-10-CM

## 2025-04-26 DIAGNOSIS — E11.42 DIABETIC POLYNEUROPATHY ASSOCIATED WITH TYPE 2 DIABETES MELLITUS (HCC): ICD-10-CM

## 2025-04-28 RX ORDER — PREGABALIN 25 MG/1
CAPSULE ORAL
Qty: 90 CAPSULE | Refills: 0 | OUTPATIENT
Start: 2025-04-28

## 2025-05-16 DIAGNOSIS — E11.42 DIABETIC POLYNEUROPATHY ASSOCIATED WITH TYPE 2 DIABETES MELLITUS (HCC): ICD-10-CM

## 2025-05-16 RX ORDER — PREGABALIN 25 MG/1
25 CAPSULE ORAL 3 TIMES DAILY
Qty: 270 CAPSULE | Refills: 1 | Status: SHIPPED | OUTPATIENT
Start: 2025-05-16 | End: 2025-11-12

## 2025-05-16 NOTE — TELEPHONE ENCOUNTER
Originally denied as she needed a future appt made before continuing refills. Called patient and appt made. Will send request to provider to fill.

## 2025-05-16 NOTE — TELEPHONE ENCOUNTER
The patient is calling asking for pregablin to be refilled. She states her pharmacy is telling her it has been denied. She is asking for a call back to discuss.

## 2025-06-06 ENCOUNTER — OFFICE VISIT (OUTPATIENT)
Age: 55
End: 2025-06-06
Payer: COMMERCIAL

## 2025-06-06 VITALS
RESPIRATION RATE: 16 BRPM | DIASTOLIC BLOOD PRESSURE: 66 MMHG | HEART RATE: 92 BPM | TEMPERATURE: 97.8 F | OXYGEN SATURATION: 98 % | WEIGHT: 258 LBS | HEIGHT: 65 IN | SYSTOLIC BLOOD PRESSURE: 100 MMHG | BODY MASS INDEX: 42.99 KG/M2

## 2025-06-06 DIAGNOSIS — E11.42 TYPE 2 DIABETES MELLITUS WITH DIABETIC POLYNEUROPATHY, UNSPECIFIED WHETHER LONG TERM INSULIN USE (HCC): ICD-10-CM

## 2025-06-06 DIAGNOSIS — G43.809 OTHER MIGRAINE WITHOUT STATUS MIGRAINOSUS, NOT INTRACTABLE: ICD-10-CM

## 2025-06-06 DIAGNOSIS — S09.90XD INJURY OF HEAD, SUBSEQUENT ENCOUNTER: ICD-10-CM

## 2025-06-06 DIAGNOSIS — R42 VERTIGO: ICD-10-CM

## 2025-06-06 DIAGNOSIS — I10 ESSENTIAL (PRIMARY) HYPERTENSION: ICD-10-CM

## 2025-06-06 DIAGNOSIS — G43.101 MIGRAINE WITH AURA AND WITH STATUS MIGRAINOSUS, NOT INTRACTABLE: ICD-10-CM

## 2025-06-06 DIAGNOSIS — E78.2 MIXED HYPERLIPIDEMIA: ICD-10-CM

## 2025-06-06 DIAGNOSIS — Z87.820 HISTORY OF CONCUSSION: Primary | ICD-10-CM

## 2025-06-06 PROCEDURE — 99214 OFFICE O/P EST MOD 30 MIN: CPT | Performed by: INTERNAL MEDICINE

## 2025-06-06 PROCEDURE — 3074F SYST BP LT 130 MM HG: CPT | Performed by: INTERNAL MEDICINE

## 2025-06-06 PROCEDURE — 3078F DIAST BP <80 MM HG: CPT | Performed by: INTERNAL MEDICINE

## 2025-06-06 RX ORDER — PROPRANOLOL HCL 20 MG
20 TABLET ORAL 2 TIMES DAILY
Qty: 60 TABLET | Refills: 3 | Status: SHIPPED | OUTPATIENT
Start: 2025-06-06

## 2025-06-06 RX ORDER — SEMAGLUTIDE 2.68 MG/ML
INJECTION, SOLUTION SUBCUTANEOUS
COMMUNITY
Start: 2025-05-24 | End: 2025-06-06

## 2025-06-06 RX ORDER — MECLIZINE HYDROCHLORIDE 25 MG/1
25 TABLET ORAL 3 TIMES DAILY PRN
Qty: 30 TABLET | Refills: 1 | Status: SHIPPED | OUTPATIENT
Start: 2025-06-06 | End: 2025-06-26

## 2025-06-06 SDOH — ECONOMIC STABILITY: FOOD INSECURITY: WITHIN THE PAST 12 MONTHS, YOU WORRIED THAT YOUR FOOD WOULD RUN OUT BEFORE YOU GOT MONEY TO BUY MORE.: NEVER TRUE

## 2025-06-06 SDOH — ECONOMIC STABILITY: FOOD INSECURITY: WITHIN THE PAST 12 MONTHS, THE FOOD YOU BOUGHT JUST DIDN'T LAST AND YOU DIDN'T HAVE MONEY TO GET MORE.: NEVER TRUE

## 2025-06-06 ASSESSMENT — PATIENT HEALTH QUESTIONNAIRE - PHQ9
1. LITTLE INTEREST OR PLEASURE IN DOING THINGS: NOT AT ALL
SUM OF ALL RESPONSES TO PHQ QUESTIONS 1-9: 0
2. FEELING DOWN, DEPRESSED OR HOPELESS: NOT AT ALL
SUM OF ALL RESPONSES TO PHQ QUESTIONS 1-9: 0

## 2025-06-06 ASSESSMENT — ENCOUNTER SYMPTOMS: RESPIRATORY NEGATIVE: 1

## 2025-06-06 NOTE — PROGRESS NOTES
Chief Complaint   Patient presents with    Diabetes    Follow-up Chronic Condition    Hypothyroidism    Hypertension    Graves' Disease     Hx of      Have you been to the ER, urgent care clinic since your last visit?  Hospitalized since your last visit?   NO    Have you seen or consulted any other health care providers outside our system since your last visit?   NO     “Have you had a pap smear?”    NO    No cervical cancer screening on file       “Have you had a colorectal cancer screening such as a colonoscopy/FIT/Cologuard?    NO    No colonoscopy on file  No cologuard on file  No FIT/FOBT on file   No flexible sigmoidoscopy on file          
reviewed with patient.     reports that she has never smoked. She has never used smokeless tobacco.    reports no history of alcohol use.   Results       Vitals:    06/06/25 1330   BP: 100/66   Pulse: 92   Resp: 16   Temp: 97.8 °F (36.6 °C)   SpO2: 98%     Body mass index is 42.93 kg/m².      6/6/2025     1:30 PM 1/17/2025     1:18 PM 7/30/2024     6:07 PM 7/12/2024    11:27 AM 5/17/2024     2:48 PM 4/5/2024    10:40 AM 8/11/2023     3:22 PM   Weight Metrics   Weight 258 lb 256 lb 255 lb 249 lb 244 lb 244 lb 9.6 oz 224 lb   BMI (Calculated) 43 kg/m2 42.7 kg/m2 0 kg/m2 40.3 kg/m2 40.7 kg/m2 39.6 kg/m2 36.2 kg/m2       Past Medical History:   Diagnosis Date    Benign pancreatic islet cell tumors     2001    Diabetes (HCC)     Migraine     Thyroid disease     hyperthyroid       Allergies   Allergen Reactions    Bee Venom Swelling    Fish-Derived Products Hives    Shellfish Allergy Hives    Terbinafine Hcl      Other reaction(s): Unknown (comments)    Lisinopril Hives and Rash     hives and swollen lip.  Other reaction(s): Rash, swelling  Other reaction(s): Rash, swelling    Terbinafine Rash     Other reaction(s): Other (see comments)  Other reaction(s): Red Spots        Current Outpatient Medications on File Prior to Visit   Medication Sig Dispense Refill    pregabalin (LYRICA) 25 MG capsule Take 1 capsule by mouth 3 times daily for 180 days. Max Daily Amount: 75 mg 270 capsule 1    OZEMPIC, 1 MG/DOSE, 4 MG/3ML SOPN sc injection INJECT 1 MG SUBCUTANEOUSLY ONCE A WEEK      topiramate (TOPAMAX) 50 MG tablet Take 1 tablet by mouth 2 times daily 180 tablet 1    Continuous Glucose Sensor (DEXCOM G7 SENSOR) MISC USE AS DIRECTED DAILY      CONTOUR TEST strip 1 each daily      Blood Glucose Monitoring Suppl (CONTOUR NEXT ONE) CHAPIS       LYUMJEV KWIKPEN 100 UNIT/ML SOPN       Respiratory Therapy Supplies (SPIROMETER) KIT 1 Device by Does not apply route in the morning and at bedtime 1 kit 0    TOUJEO SOLOSTAR 300 UNIT/ML

## 2025-06-07 DIAGNOSIS — G43.101 MIGRAINE WITH AURA AND WITH STATUS MIGRAINOSUS, NOT INTRACTABLE: ICD-10-CM

## 2025-06-09 RX ORDER — PROPRANOLOL HCL 20 MG
20 TABLET ORAL 2 TIMES DAILY
Qty: 180 TABLET | Refills: 1 | OUTPATIENT
Start: 2025-06-09

## 2025-06-09 RX ORDER — TOPIRAMATE 50 MG/1
50 TABLET, FILM COATED ORAL 2 TIMES DAILY
Qty: 180 TABLET | Refills: 1 | Status: SHIPPED | OUTPATIENT
Start: 2025-06-09

## 2025-06-15 DIAGNOSIS — D50.9 IRON DEFICIENCY ANEMIA, UNSPECIFIED IRON DEFICIENCY ANEMIA TYPE: ICD-10-CM

## 2025-06-16 RX ORDER — FERROUS SULFATE 324(65)MG
TABLET, DELAYED RELEASE (ENTERIC COATED) ORAL
Qty: 30 TABLET | Refills: 1 | Status: SHIPPED | OUTPATIENT
Start: 2025-06-16 | End: 2025-07-16

## 2025-08-24 DIAGNOSIS — D50.9 IRON DEFICIENCY ANEMIA, UNSPECIFIED IRON DEFICIENCY ANEMIA TYPE: ICD-10-CM

## 2025-08-25 RX ORDER — FERROUS SULFATE 324(65)MG
TABLET, DELAYED RELEASE (ENTERIC COATED) ORAL
Qty: 30 TABLET | Refills: 1 | Status: SHIPPED | OUTPATIENT
Start: 2025-08-25 | End: 2025-09-24